# Patient Record
Sex: MALE | Race: WHITE | NOT HISPANIC OR LATINO
[De-identification: names, ages, dates, MRNs, and addresses within clinical notes are randomized per-mention and may not be internally consistent; named-entity substitution may affect disease eponyms.]

---

## 2017-06-28 ENCOUNTER — APPOINTMENT (OUTPATIENT)
Dept: UROLOGY | Facility: CLINIC | Age: 32
End: 2017-06-28

## 2017-06-28 VITALS
SYSTOLIC BLOOD PRESSURE: 127 MMHG | TEMPERATURE: 97.6 F | DIASTOLIC BLOOD PRESSURE: 89 MMHG | WEIGHT: 220 LBS | HEIGHT: 69 IN | BODY MASS INDEX: 32.58 KG/M2 | HEART RATE: 79 BPM | OXYGEN SATURATION: 96 %

## 2017-09-13 ENCOUNTER — RESULT REVIEW (OUTPATIENT)
Age: 32
End: 2017-09-13

## 2018-09-07 ENCOUNTER — APPOINTMENT (OUTPATIENT)
Dept: UROLOGY | Facility: CLINIC | Age: 33
End: 2018-09-07
Payer: COMMERCIAL

## 2018-09-07 VITALS
HEIGHT: 69 IN | BODY MASS INDEX: 33.33 KG/M2 | OXYGEN SATURATION: 97 % | SYSTOLIC BLOOD PRESSURE: 130 MMHG | RESPIRATION RATE: 18 BRPM | HEART RATE: 88 BPM | WEIGHT: 225 LBS | DIASTOLIC BLOOD PRESSURE: 82 MMHG

## 2018-09-07 DIAGNOSIS — N46.9 MALE INFERTILITY, UNSPECIFIED: ICD-10-CM

## 2018-09-07 PROCEDURE — 99213 OFFICE O/P EST LOW 20 MIN: CPT

## 2018-09-21 ENCOUNTER — APPOINTMENT (OUTPATIENT)
Dept: UROLOGY | Facility: CLINIC | Age: 33
End: 2018-09-21
Payer: COMMERCIAL

## 2018-09-21 PROCEDURE — 54235 NJX CORPORA CAVERNOSA RX AGT: CPT

## 2018-09-21 PROCEDURE — 93980 PENILE VASCULAR STUDY: CPT

## 2018-09-24 ENCOUNTER — MEDICATION RENEWAL (OUTPATIENT)
Age: 33
End: 2018-09-24

## 2018-11-30 ENCOUNTER — EMERGENCY (EMERGENCY)
Facility: HOSPITAL | Age: 33
LOS: 0 days | Discharge: HOME | End: 2018-12-01
Attending: EMERGENCY MEDICINE | Admitting: EMERGENCY MEDICINE
Payer: COMMERCIAL

## 2018-11-30 VITALS
HEART RATE: 96 BPM | SYSTOLIC BLOOD PRESSURE: 152 MMHG | TEMPERATURE: 98 F | DIASTOLIC BLOOD PRESSURE: 95 MMHG | OXYGEN SATURATION: 100 % | RESPIRATION RATE: 18 BRPM

## 2018-11-30 DIAGNOSIS — R00.2 PALPITATIONS: ICD-10-CM

## 2018-11-30 DIAGNOSIS — R07.9 CHEST PAIN, UNSPECIFIED: ICD-10-CM

## 2018-11-30 DIAGNOSIS — Z88.0 ALLERGY STATUS TO PENICILLIN: ICD-10-CM

## 2018-11-30 DIAGNOSIS — Z79.899 OTHER LONG TERM (CURRENT) DRUG THERAPY: ICD-10-CM

## 2018-11-30 PROCEDURE — 99285 EMERGENCY DEPT VISIT HI MDM: CPT | Mod: 25

## 2018-12-01 VITALS
TEMPERATURE: 99 F | DIASTOLIC BLOOD PRESSURE: 66 MMHG | SYSTOLIC BLOOD PRESSURE: 136 MMHG | HEART RATE: 94 BPM | RESPIRATION RATE: 17 BRPM | OXYGEN SATURATION: 100 %

## 2018-12-01 LAB
ALBUMIN SERPL ELPH-MCNC: 3.9 G/DL — SIGNIFICANT CHANGE UP (ref 3.3–5)
ALP SERPL-CCNC: 107 U/L — SIGNIFICANT CHANGE UP (ref 40–120)
ALT FLD-CCNC: 36 U/L — SIGNIFICANT CHANGE UP (ref 12–78)
AMPHET UR-MCNC: NEGATIVE — SIGNIFICANT CHANGE UP
ANION GAP SERPL CALC-SCNC: 9 MMOL/L — SIGNIFICANT CHANGE UP (ref 5–17)
APAP SERPL-MCNC: < 2 UG/ML (ref 10–30)
APPEARANCE UR: CLEAR — SIGNIFICANT CHANGE UP
AST SERPL-CCNC: 24 U/L — SIGNIFICANT CHANGE UP (ref 15–37)
BARBITURATES UR SCN-MCNC: NEGATIVE — SIGNIFICANT CHANGE UP
BASOPHILS # BLD AUTO: 0.03 K/UL — SIGNIFICANT CHANGE UP (ref 0–0.2)
BASOPHILS NFR BLD AUTO: 0.3 % — SIGNIFICANT CHANGE UP (ref 0–2)
BENZODIAZ UR-MCNC: NEGATIVE — SIGNIFICANT CHANGE UP
BILIRUB SERPL-MCNC: 0.3 MG/DL — SIGNIFICANT CHANGE UP (ref 0.2–1.2)
BILIRUB UR-MCNC: NEGATIVE — SIGNIFICANT CHANGE UP
BUN SERPL-MCNC: 23 MG/DL — SIGNIFICANT CHANGE UP (ref 7–23)
CALCIUM SERPL-MCNC: 8.8 MG/DL — SIGNIFICANT CHANGE UP (ref 8.5–10.1)
CHLORIDE SERPL-SCNC: 111 MMOL/L — HIGH (ref 96–108)
CO2 SERPL-SCNC: 21 MMOL/L — LOW (ref 22–31)
COCAINE METAB.OTHER UR-MCNC: NEGATIVE — SIGNIFICANT CHANGE UP
COLOR SPEC: YELLOW — SIGNIFICANT CHANGE UP
CREAT SERPL-MCNC: 1.14 MG/DL — SIGNIFICANT CHANGE UP (ref 0.5–1.3)
DIFF PNL FLD: NEGATIVE — SIGNIFICANT CHANGE UP
EOSINOPHIL # BLD AUTO: 0.11 K/UL — SIGNIFICANT CHANGE UP (ref 0–0.5)
EOSINOPHIL NFR BLD AUTO: 1.2 % — SIGNIFICANT CHANGE UP (ref 0–6)
ETHANOL SERPL-MCNC: <10 MG/DL — SIGNIFICANT CHANGE UP (ref 0–10)
GLUCOSE SERPL-MCNC: 97 MG/DL — SIGNIFICANT CHANGE UP (ref 70–99)
GLUCOSE UR QL: NEGATIVE MG/DL — SIGNIFICANT CHANGE UP
HCT VFR BLD CALC: 37.8 % — LOW (ref 39–50)
HGB BLD-MCNC: 12.7 G/DL — LOW (ref 13–17)
IMM GRANULOCYTES NFR BLD AUTO: 0.3 % — SIGNIFICANT CHANGE UP (ref 0–1.5)
KETONES UR-MCNC: NEGATIVE — SIGNIFICANT CHANGE UP
LEUKOCYTE ESTERASE UR-ACNC: NEGATIVE — SIGNIFICANT CHANGE UP
LYMPHOCYTES # BLD AUTO: 1.6 K/UL — SIGNIFICANT CHANGE UP (ref 1–3.3)
LYMPHOCYTES # BLD AUTO: 16.8 % — SIGNIFICANT CHANGE UP (ref 13–44)
MCHC RBC-ENTMCNC: 26.4 PG — LOW (ref 27–34)
MCHC RBC-ENTMCNC: 33.6 GM/DL — SIGNIFICANT CHANGE UP (ref 32–36)
MCV RBC AUTO: 78.6 FL — LOW (ref 80–100)
METHADONE UR-MCNC: NEGATIVE — SIGNIFICANT CHANGE UP
MONOCYTES # BLD AUTO: 0.68 K/UL — SIGNIFICANT CHANGE UP (ref 0–0.9)
MONOCYTES NFR BLD AUTO: 7.1 % — SIGNIFICANT CHANGE UP (ref 2–14)
NEUTROPHILS # BLD AUTO: 7.09 K/UL — SIGNIFICANT CHANGE UP (ref 1.8–7.4)
NEUTROPHILS NFR BLD AUTO: 74.3 % — SIGNIFICANT CHANGE UP (ref 43–77)
NITRITE UR-MCNC: NEGATIVE — SIGNIFICANT CHANGE UP
NRBC # BLD: 0 /100 WBCS — SIGNIFICANT CHANGE UP (ref 0–0)
OPIATES UR-MCNC: NEGATIVE — SIGNIFICANT CHANGE UP
PCP SPEC-MCNC: SIGNIFICANT CHANGE UP
PCP UR-MCNC: NEGATIVE — SIGNIFICANT CHANGE UP
PH UR: 8 — SIGNIFICANT CHANGE UP (ref 5–8)
PLATELET # BLD AUTO: 244 K/UL — SIGNIFICANT CHANGE UP (ref 150–400)
POTASSIUM SERPL-MCNC: 3.4 MMOL/L — LOW (ref 3.5–5.3)
POTASSIUM SERPL-SCNC: 3.4 MMOL/L — LOW (ref 3.5–5.3)
PROT SERPL-MCNC: 7.5 GM/DL — SIGNIFICANT CHANGE UP (ref 6–8.3)
PROT UR-MCNC: NEGATIVE MG/DL — SIGNIFICANT CHANGE UP
RBC # BLD: 4.81 M/UL — SIGNIFICANT CHANGE UP (ref 4.2–5.8)
RBC # FLD: 13.4 % — SIGNIFICANT CHANGE UP (ref 10.3–14.5)
SALICYLATES SERPL-MCNC: <1.7 MG/DL — LOW (ref 2.8–20)
SODIUM SERPL-SCNC: 141 MMOL/L — SIGNIFICANT CHANGE UP (ref 135–145)
SP GR SPEC: 1.01 — SIGNIFICANT CHANGE UP (ref 1.01–1.02)
THC UR QL: NEGATIVE — SIGNIFICANT CHANGE UP
TSH SERPL-MCNC: 1.21 UU/ML — SIGNIFICANT CHANGE UP (ref 0.34–4.82)
UROBILINOGEN FLD QL: NEGATIVE MG/DL — SIGNIFICANT CHANGE UP
WBC # BLD: 9.54 K/UL — SIGNIFICANT CHANGE UP (ref 3.8–10.5)
WBC # FLD AUTO: 9.54 K/UL — SIGNIFICANT CHANGE UP (ref 3.8–10.5)

## 2018-12-01 PROCEDURE — 93010 ELECTROCARDIOGRAM REPORT: CPT

## 2018-12-01 PROCEDURE — 71046 X-RAY EXAM CHEST 2 VIEWS: CPT | Mod: 26

## 2018-12-01 RX ORDER — SODIUM CHLORIDE 9 MG/ML
2000 INJECTION INTRAMUSCULAR; INTRAVENOUS; SUBCUTANEOUS ONCE
Qty: 0 | Refills: 0 | Status: COMPLETED | OUTPATIENT
Start: 2018-12-01 | End: 2018-12-01

## 2018-12-01 RX ADMIN — SODIUM CHLORIDE 2000 MILLILITER(S): 9 INJECTION INTRAMUSCULAR; INTRAVENOUS; SUBCUTANEOUS at 01:31

## 2018-12-01 RX ADMIN — Medication 1 MILLIGRAM(S): at 01:55

## 2018-12-01 RX ADMIN — SODIUM CHLORIDE 2000 MILLILITER(S): 9 INJECTION INTRAMUSCULAR; INTRAVENOUS; SUBCUTANEOUS at 02:31

## 2018-12-01 NOTE — ED ADULT NURSE NOTE - CHPI ED NUR SYMPTOMS NEG
no fever/no pain/no vomiting/no decreased eating/drinking/no weakness/no dizziness/no chills/no tingling/no nausea

## 2018-12-01 NOTE — ED PROVIDER NOTE - NSFOLLOWUPINSTRUCTIONS_ED_ALL_ED_FT
please follow up with your doctor in 2-3 days.   drink plenty of fluids.   return to ED for any concerns

## 2018-12-01 NOTE — ED PROVIDER NOTE - OBJECTIVE STATEMENT
32 y/o male in ED c/o sudden onset of cp, palpitations, sob, numbness and tingling to bilateral fingers and "difficulty catching my breath" x 1 hr while on train going home from work.   pt denies any previous episodes.    pt denies any drugs or alcohol use.   pt denies any fever, HA, neck pain, n/v/d/abd pain.   tolerating PO.   no sick contacts or recent travel

## 2019-08-29 NOTE — ASU PATIENT PROFILE, ADULT - PMH
ADHD    Fatty liver    GERD (gastroesophageal reflux disease)    HLD (hyperlipidemia)    Vitamin B 12 deficiency

## 2019-08-30 ENCOUNTER — RESULT REVIEW (OUTPATIENT)
Age: 34
End: 2019-08-30

## 2019-08-30 ENCOUNTER — OUTPATIENT (OUTPATIENT)
Dept: INPATIENT UNIT | Facility: HOSPITAL | Age: 34
LOS: 1 days | Discharge: HOME | End: 2019-08-30
Payer: COMMERCIAL

## 2019-08-30 VITALS
OXYGEN SATURATION: 99 % | HEART RATE: 76 BPM | TEMPERATURE: 98 F | WEIGHT: 206.35 LBS | HEIGHT: 69 IN | RESPIRATION RATE: 14 BRPM | SYSTOLIC BLOOD PRESSURE: 110 MMHG | DIASTOLIC BLOOD PRESSURE: 67 MMHG

## 2019-08-30 VITALS
DIASTOLIC BLOOD PRESSURE: 68 MMHG | OXYGEN SATURATION: 100 % | TEMPERATURE: 98 F | RESPIRATION RATE: 15 BRPM | HEART RATE: 80 BPM | SYSTOLIC BLOOD PRESSURE: 120 MMHG

## 2019-08-30 DIAGNOSIS — J03.90 ACUTE TONSILLITIS, UNSPECIFIED: ICD-10-CM

## 2019-08-30 PROCEDURE — 88304 TISSUE EXAM BY PATHOLOGIST: CPT | Mod: 26

## 2019-08-30 PROCEDURE — 88304 TISSUE EXAM BY PATHOLOGIST: CPT

## 2019-08-30 RX ORDER — PROCHLORPERAZINE MALEATE 5 MG
10 TABLET ORAL ONCE
Refills: 0 | Status: COMPLETED | OUTPATIENT
Start: 2019-08-30 | End: 2019-08-30

## 2019-08-30 RX ORDER — METHYLPHENIDATE HCL 5 MG
1 TABLET ORAL
Qty: 0 | Refills: 0 | DISCHARGE

## 2019-08-30 RX ORDER — ACETAMINOPHEN 500 MG
975 TABLET ORAL ONCE
Refills: 0 | Status: COMPLETED | OUTPATIENT
Start: 2019-08-30 | End: 2019-08-30

## 2019-08-30 RX ORDER — ONDANSETRON 8 MG/1
4 TABLET, FILM COATED ORAL ONCE
Refills: 0 | Status: COMPLETED | OUTPATIENT
Start: 2019-08-30 | End: 2019-08-30

## 2019-08-30 RX ORDER — SODIUM CHLORIDE 9 MG/ML
1000 INJECTION, SOLUTION INTRAVENOUS
Refills: 0 | Status: DISCONTINUED | OUTPATIENT
Start: 2019-08-30 | End: 2019-08-30

## 2019-08-30 RX ORDER — MEPERIDINE HYDROCHLORIDE 50 MG/ML
12.5 INJECTION INTRAMUSCULAR; INTRAVENOUS; SUBCUTANEOUS
Refills: 0 | Status: DISCONTINUED | OUTPATIENT
Start: 2019-08-30 | End: 2019-08-30

## 2019-08-30 RX ORDER — FAMOTIDINE 10 MG/ML
20 INJECTION INTRAVENOUS ONCE
Refills: 0 | Status: COMPLETED | OUTPATIENT
Start: 2019-08-30 | End: 2019-08-30

## 2019-08-30 RX ORDER — RANITIDINE HYDROCHLORIDE 150 MG/1
1 TABLET, FILM COATED ORAL
Qty: 0 | Refills: 0 | DISCHARGE

## 2019-08-30 RX ORDER — FENTANYL CITRATE 50 UG/ML
50 INJECTION INTRAVENOUS
Refills: 0 | Status: DISCONTINUED | OUTPATIENT
Start: 2019-08-30 | End: 2019-08-30

## 2019-08-30 RX ORDER — OXYCODONE HYDROCHLORIDE 5 MG/1
5 TABLET ORAL ONCE
Refills: 0 | Status: DISCONTINUED | OUTPATIENT
Start: 2019-08-30 | End: 2019-08-30

## 2019-08-30 RX ORDER — OMEPRAZOLE 10 MG/1
1 CAPSULE, DELAYED RELEASE ORAL
Qty: 0 | Refills: 0 | DISCHARGE

## 2019-08-30 RX ORDER — ROBINUL 0.2 MG/ML
0.2 INJECTION INTRAMUSCULAR; INTRAVENOUS ONCE
Refills: 0 | Status: COMPLETED | OUTPATIENT
Start: 2019-08-30 | End: 2019-08-30

## 2019-08-30 RX ORDER — METOCLOPRAMIDE HCL 10 MG
10 TABLET ORAL ONCE
Refills: 0 | Status: COMPLETED | OUTPATIENT
Start: 2019-08-30 | End: 2019-08-30

## 2019-08-30 RX ORDER — OXYCODONE HYDROCHLORIDE 5 MG/1
10 TABLET ORAL ONCE
Refills: 0 | Status: DISCONTINUED | OUTPATIENT
Start: 2019-08-30 | End: 2019-08-30

## 2019-08-30 RX ADMIN — Medication 975 MILLIGRAM(S): at 13:09

## 2019-08-30 RX ADMIN — ROBINUL 0.2 MILLIGRAM(S): 0.2 INJECTION INTRAMUSCULAR; INTRAVENOUS at 13:08

## 2019-08-30 RX ADMIN — ONDANSETRON 4 MILLIGRAM(S): 8 TABLET, FILM COATED ORAL at 14:40

## 2019-08-30 RX ADMIN — FENTANYL CITRATE 50 MICROGRAM(S): 50 INJECTION INTRAVENOUS at 15:46

## 2019-08-30 RX ADMIN — FAMOTIDINE 20 MILLIGRAM(S): 10 INJECTION INTRAVENOUS at 13:08

## 2019-08-30 RX ADMIN — FENTANYL CITRATE 50 MICROGRAM(S): 50 INJECTION INTRAVENOUS at 14:35

## 2019-08-30 RX ADMIN — Medication 975 MILLIGRAM(S): at 13:08

## 2019-08-30 RX ADMIN — Medication 10 MILLIGRAM(S): at 14:35

## 2019-08-30 RX ADMIN — Medication 10 MILLIGRAM(S): at 13:08

## 2019-08-30 RX ADMIN — OXYCODONE HYDROCHLORIDE 5 MILLIGRAM(S): 5 TABLET ORAL at 14:45

## 2019-08-30 RX ADMIN — OXYCODONE HYDROCHLORIDE 5 MILLIGRAM(S): 5 TABLET ORAL at 15:47

## 2019-09-05 DIAGNOSIS — J35.01 CHRONIC TONSILLITIS: ICD-10-CM

## 2019-09-09 ENCOUNTER — INPATIENT (INPATIENT)
Facility: HOSPITAL | Age: 34
LOS: 0 days | Discharge: HOME | DRG: 909 | End: 2019-09-09
Attending: OTOLARYNGOLOGY | Admitting: OTOLARYNGOLOGY
Payer: COMMERCIAL

## 2019-09-09 VITALS
HEART RATE: 95 BPM | DIASTOLIC BLOOD PRESSURE: 68 MMHG | TEMPERATURE: 99 F | SYSTOLIC BLOOD PRESSURE: 123 MMHG | RESPIRATION RATE: 15 BRPM | OXYGEN SATURATION: 99 %

## 2019-09-09 VITALS — HEIGHT: 69 IN | WEIGHT: 199.96 LBS

## 2019-09-09 DIAGNOSIS — J35.8 OTHER CHRONIC DISEASES OF TONSILS AND ADENOIDS: ICD-10-CM

## 2019-09-09 PROBLEM — E53.8 DEFICIENCY OF OTHER SPECIFIED B GROUP VITAMINS: Chronic | Status: ACTIVE | Noted: 2019-08-29

## 2019-09-09 PROBLEM — K21.9 GASTRO-ESOPHAGEAL REFLUX DISEASE WITHOUT ESOPHAGITIS: Chronic | Status: ACTIVE | Noted: 2019-08-29

## 2019-09-09 PROBLEM — F90.9 ATTENTION-DEFICIT HYPERACTIVITY DISORDER, UNSPECIFIED TYPE: Chronic | Status: ACTIVE | Noted: 2019-08-29

## 2019-09-09 PROBLEM — E78.5 HYPERLIPIDEMIA, UNSPECIFIED: Chronic | Status: ACTIVE | Noted: 2019-08-29

## 2019-09-09 PROBLEM — K76.0 FATTY (CHANGE OF) LIVER, NOT ELSEWHERE CLASSIFIED: Chronic | Status: ACTIVE | Noted: 2019-08-29

## 2019-09-09 LAB
ALBUMIN SERPL ELPH-MCNC: 3.6 G/DL — SIGNIFICANT CHANGE UP (ref 3.3–5)
ALP SERPL-CCNC: 87 U/L — SIGNIFICANT CHANGE UP (ref 40–120)
ALT FLD-CCNC: 28 U/L — SIGNIFICANT CHANGE UP (ref 12–78)
ANION GAP SERPL CALC-SCNC: 6 MMOL/L — SIGNIFICANT CHANGE UP (ref 5–17)
AST SERPL-CCNC: 13 U/L — LOW (ref 15–37)
BASOPHILS # BLD AUTO: 0 K/UL — SIGNIFICANT CHANGE UP (ref 0–0.2)
BASOPHILS NFR BLD AUTO: 0 % — SIGNIFICANT CHANGE UP (ref 0–2)
BILIRUB SERPL-MCNC: 0.4 MG/DL — SIGNIFICANT CHANGE UP (ref 0.2–1.2)
BUN SERPL-MCNC: 31 MG/DL — HIGH (ref 7–23)
CALCIUM SERPL-MCNC: 9.3 MG/DL — SIGNIFICANT CHANGE UP (ref 8.5–10.1)
CHLORIDE SERPL-SCNC: 108 MMOL/L — SIGNIFICANT CHANGE UP (ref 96–108)
CO2 SERPL-SCNC: 28 MMOL/L — SIGNIFICANT CHANGE UP (ref 22–31)
CREAT SERPL-MCNC: 1.05 MG/DL — SIGNIFICANT CHANGE UP (ref 0.5–1.3)
EOSINOPHIL # BLD AUTO: 0.16 K/UL — SIGNIFICANT CHANGE UP (ref 0–0.5)
EOSINOPHIL NFR BLD AUTO: 1 % — SIGNIFICANT CHANGE UP (ref 0–6)
GLUCOSE SERPL-MCNC: 110 MG/DL — HIGH (ref 70–99)
HCT VFR BLD CALC: 36.6 % — LOW (ref 39–50)
HGB BLD-MCNC: 11.9 G/DL — LOW (ref 13–17)
LIDOCAIN IGE QN: 81 U/L — SIGNIFICANT CHANGE UP (ref 73–393)
LYMPHOCYTES # BLD AUTO: 15 % — SIGNIFICANT CHANGE UP (ref 13–44)
LYMPHOCYTES # BLD AUTO: 2.39 K/UL — SIGNIFICANT CHANGE UP (ref 1–3.3)
MCHC RBC-ENTMCNC: 26.9 PG — LOW (ref 27–34)
MCHC RBC-ENTMCNC: 32.5 GM/DL — SIGNIFICANT CHANGE UP (ref 32–36)
MCV RBC AUTO: 82.6 FL — SIGNIFICANT CHANGE UP (ref 80–100)
MONOCYTES # BLD AUTO: 1.27 K/UL — HIGH (ref 0–0.9)
MONOCYTES NFR BLD AUTO: 8 % — SIGNIFICANT CHANGE UP (ref 2–14)
NEUTROPHILS # BLD AUTO: 12.09 K/UL — HIGH (ref 1.8–7.4)
NEUTROPHILS NFR BLD AUTO: 76 % — SIGNIFICANT CHANGE UP (ref 43–77)
NRBC # BLD: SIGNIFICANT CHANGE UP /100 WBCS (ref 0–0)
PLATELET # BLD AUTO: 473 K/UL — HIGH (ref 150–400)
POTASSIUM SERPL-MCNC: 4.2 MMOL/L — SIGNIFICANT CHANGE UP (ref 3.5–5.3)
POTASSIUM SERPL-SCNC: 4.2 MMOL/L — SIGNIFICANT CHANGE UP (ref 3.5–5.3)
PROT SERPL-MCNC: 7.2 GM/DL — SIGNIFICANT CHANGE UP (ref 6–8.3)
RBC # BLD: 4.43 M/UL — SIGNIFICANT CHANGE UP (ref 4.2–5.8)
RBC # FLD: 12.8 % — SIGNIFICANT CHANGE UP (ref 10.3–14.5)
SODIUM SERPL-SCNC: 142 MMOL/L — SIGNIFICANT CHANGE UP (ref 135–145)
WBC # BLD: 15.91 K/UL — HIGH (ref 3.8–10.5)
WBC # FLD AUTO: 15.91 K/UL — HIGH (ref 3.8–10.5)

## 2019-09-09 PROCEDURE — 93010 ELECTROCARDIOGRAM REPORT: CPT

## 2019-09-09 PROCEDURE — 86920 COMPATIBILITY TEST SPIN: CPT

## 2019-09-09 RX ORDER — ONDANSETRON 8 MG/1
4 TABLET, FILM COATED ORAL EVERY 6 HOURS
Refills: 0 | Status: DISCONTINUED | OUTPATIENT
Start: 2019-09-09 | End: 2019-09-09

## 2019-09-09 RX ORDER — SODIUM CHLORIDE 9 MG/ML
1000 INJECTION INTRAMUSCULAR; INTRAVENOUS; SUBCUTANEOUS ONCE
Refills: 0 | Status: COMPLETED | OUTPATIENT
Start: 2019-09-09 | End: 2019-09-09

## 2019-09-09 RX ORDER — SODIUM CHLORIDE 9 MG/ML
1000 INJECTION, SOLUTION INTRAVENOUS
Refills: 0 | Status: DISCONTINUED | OUTPATIENT
Start: 2019-09-09 | End: 2019-09-09

## 2019-09-09 RX ORDER — OXYCODONE HYDROCHLORIDE 5 MG/1
5 TABLET ORAL ONCE
Refills: 0 | Status: DISCONTINUED | OUTPATIENT
Start: 2019-09-09 | End: 2019-09-09

## 2019-09-09 RX ORDER — FENTANYL CITRATE 50 UG/ML
50 INJECTION INTRAVENOUS
Refills: 0 | Status: DISCONTINUED | OUTPATIENT
Start: 2019-09-09 | End: 2019-09-09

## 2019-09-09 RX ORDER — OXYCODONE AND ACETAMINOPHEN 5; 325 MG/1; MG/1
1 TABLET ORAL EVERY 4 HOURS
Refills: 0 | Status: DISCONTINUED | OUTPATIENT
Start: 2019-09-09 | End: 2019-09-09

## 2019-09-09 RX ADMIN — SODIUM CHLORIDE 1000 MILLILITER(S): 9 INJECTION INTRAMUSCULAR; INTRAVENOUS; SUBCUTANEOUS at 14:15

## 2019-09-09 NOTE — ED ADULT NURSE NOTE - OBJECTIVE STATEMENT
Pt presents to the ED s/p tonsillary bleed after having tonsils removed this past week. Pt denies fevers/chills. Pt currently diaphoretic, tachycardic to 140's, pt with hematemesis.

## 2019-09-09 NOTE — ED STATDOCS - OBJECTIVE STATEMENT
34 y/o male with a PMHx of ADHD, fatty liver, GERD, HLD, vitamin B12 deficiency, s/p tonsillectomy 11 days ago presents to the ED for bleeding from right tonsil starting today. Pt was seen at Dr. Mejia's office and was sent to ED for preop. Pt to go to OR today. No other complaints at this time.

## 2019-09-09 NOTE — ASU DISCHARGE PLAN (ADULT/PEDIATRIC) - CARE PROVIDER_API CALL
Fili Mejia)  Otolaryngology  [address] [place] [address]  Phone: [phone]  Fax: [phone]  Follow Up Time: 1-3 days

## 2019-09-09 NOTE — ED ADULT TRIAGE NOTE - CHIEF COMPLAINT QUOTE
Patient presents with bleeding from right tonsil. Patient was seen in Dr Mejia office and sent to ER for registration for pre op. Patient reports last PO intake last night. Denies difficulty breathing denies trouble swallowing.

## 2019-09-09 NOTE — ED ADULT NURSE NOTE - NSIMPLEMENTINTERV_GEN_ALL_ED
Implemented All Universal Safety Interventions:  [place] to call system. Call bell, personal items and telephone within reach. Instruct patient to call for assistance. Room bathroom lighting operational. Non-slip footwear when patient is off stretcher. Physically safe environment: no spills, clutter or unnecessary equipment. Stretcher in lowest position, wheels locked, appropriate side rails in place.

## 2019-09-09 NOTE — ED STATDOCS - PROGRESS NOTE DETAILS
Patient seen and evaluated in intake with ED Attending,  ED attending note and orders reviewed, will continue with patient follow up and care -Rosie Holman PA-C   spoke to Opal Mejia and he will take pt to the OR. labs ordered, will reeval. -Rosie Holman PA-C pt spitting up blood and looks pale will continue to follow in the main, gave ivfs will reeval freq. -Rosie Holman PA-C pt stable going to OR. -Rosie STEWARDC

## 2019-09-09 NOTE — ED STATDOCS - ENMT, MLM
Nasal mucosa clear.  Mouth with normal mucosa  Throat has no vesicles, no oropharyngeal exudates and uvula is midline. Right sided tonsillar bleeding, red in color.

## 2019-09-16 DIAGNOSIS — K76.0 FATTY (CHANGE OF) LIVER, NOT ELSEWHERE CLASSIFIED: ICD-10-CM

## 2019-09-16 DIAGNOSIS — J95.830 POSTPROCEDURAL HEMORRHAGE OF A RESPIRATORY SYSTEM ORGAN OR STRUCTURE FOLLOWING A RESPIRATORY SYSTEM PROCEDURE: ICD-10-CM

## 2019-09-16 DIAGNOSIS — Z88.0 ALLERGY STATUS TO PENICILLIN: ICD-10-CM

## 2019-09-16 DIAGNOSIS — K21.9 GASTRO-ESOPHAGEAL REFLUX DISEASE WITHOUT ESOPHAGITIS: ICD-10-CM
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
## 2019-09-16 DIAGNOSIS — F90.9 ATTENTION-DEFICIT HYPERACTIVITY DISORDER, UNSPECIFIED TYPE: ICD-10-CM

## 2019-09-16 DIAGNOSIS — E78.5 HYPERLIPIDEMIA, UNSPECIFIED: ICD-10-CM

## 2019-09-16 DIAGNOSIS — E53.8 DEFICIENCY OF OTHER SPECIFIED B GROUP VITAMINS: ICD-10-CM
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
## 2019-10-11 ENCOUNTER — APPOINTMENT (OUTPATIENT)
Dept: UROLOGY | Facility: CLINIC | Age: 34
End: 2019-10-11
Payer: COMMERCIAL

## 2019-10-11 VITALS
BODY MASS INDEX: 30.21 KG/M2 | RESPIRATION RATE: 18 BRPM | HEIGHT: 69 IN | HEART RATE: 89 BPM | DIASTOLIC BLOOD PRESSURE: 69 MMHG | WEIGHT: 204 LBS | SYSTOLIC BLOOD PRESSURE: 117 MMHG | TEMPERATURE: 97.3 F | OXYGEN SATURATION: 99 %

## 2019-10-11 DIAGNOSIS — Z30.2 ENCOUNTER FOR STERILIZATION: ICD-10-CM

## 2019-10-11 PROCEDURE — 99214 OFFICE O/P EST MOD 30 MIN: CPT

## 2019-10-17 LAB — TESTOST SERPL-MCNC: 189 NG/DL

## 2019-10-17 NOTE — REVIEW OF SYSTEMS
[Feeling Tired] : feeling tired [see HPI] : see HPI [Negative] : Heme/Lymph [Fever] : no fever [Chills] : no chills [Feeling Poorly] : not feeling poorly [Recent Weight Gain (___ Lbs)] : no recent weight gain [Recent Weight Loss (___ Lbs)] : no recent weight loss

## 2019-10-17 NOTE — HISTORY OF PRESENT ILLNESS
[FreeTextEntry1] : The patient is here and is concerned about his testosterone level is a feels that he is low sex drive.  \par He also is interested in learning about a vasectomy as he is considering this option

## 2019-10-17 NOTE — END OF VISIT
[FreeTextEntry3] : The procedure was gone over in detail and he is informed that infertility is not guaranteed until a negative semen analysis is obtained. He was offered the procedure as an ambulatory procedure the hospital versus the office and also that post procedural hematomas may need to be operated on under anesthesia the hospital.\par A testosterone level was ordered as well

## 2019-10-28 ENCOUNTER — APPOINTMENT (OUTPATIENT)
Dept: NEUROLOGY | Facility: CLINIC | Age: 34
End: 2019-10-28
Payer: COMMERCIAL

## 2019-10-28 VITALS
SYSTOLIC BLOOD PRESSURE: 126 MMHG | DIASTOLIC BLOOD PRESSURE: 86 MMHG | HEART RATE: 93 BPM | WEIGHT: 204 LBS | HEIGHT: 69 IN | BODY MASS INDEX: 30.21 KG/M2

## 2019-10-28 DIAGNOSIS — Z80.3 FAMILY HISTORY OF MALIGNANT NEOPLASM OF BREAST: ICD-10-CM

## 2019-10-28 DIAGNOSIS — K21.9 GASTRO-ESOPHAGEAL REFLUX DISEASE W/OUT ESOPHAGITIS: ICD-10-CM

## 2019-10-28 DIAGNOSIS — Z82.3 FAMILY HISTORY OF STROKE: ICD-10-CM

## 2019-10-28 DIAGNOSIS — Z78.9 OTHER SPECIFIED HEALTH STATUS: ICD-10-CM

## 2019-10-28 DIAGNOSIS — Z82.49 FAMILY HISTORY OF ISCHEMIC HEART DISEASE AND OTHER DISEASES OF THE CIRCULATORY SYSTEM: ICD-10-CM

## 2019-10-28 PROCEDURE — 99244 OFF/OP CNSLTJ NEW/EST MOD 40: CPT

## 2019-10-28 RX ORDER — OMEPRAZOLE 40 MG/1
40 CAPSULE, DELAYED RELEASE ORAL
Refills: 0 | Status: ACTIVE | COMMUNITY

## 2019-10-28 RX ORDER — SILDENAFIL 25 MG/1
25 TABLET ORAL
Qty: 30 | Refills: 3 | Status: DISCONTINUED | COMMUNITY
Start: 2018-09-07 | End: 2019-10-28

## 2019-10-28 RX ORDER — SILDENAFIL 20 MG/1
20 TABLET ORAL
Qty: 90 | Refills: 11 | Status: DISCONTINUED | COMMUNITY
Start: 2018-09-21 | End: 2019-10-28

## 2019-10-28 RX ORDER — CLOMIPHENE CITRATE 50 MG/1
50 TABLET ORAL DAILY
Qty: 90 | Refills: 1 | Status: DISCONTINUED | COMMUNITY
Start: 2017-07-11 | End: 2019-10-28

## 2019-10-28 NOTE — PHYSICAL EXAM
[FreeTextEntry1] : Examination:\par Constitutional: normal, no apparent distress\par Eyes: normal conjunctiva b/l, no ptosis, visual fields full\par Respiratory: no respiratory distress, normal effort, normal auscultation\par Cardiovascular: normal rate, rhythm, no murmurs\par Neck: supple, no masses\par Vascular: carotids normal\par Skin: normal color, no rashes\par Psych: normal mood, affect\par \par Neurological:\par Memory:  oriented to person, place, time, recalled 2/3 words after 3 minutes. unable to perform serial 7s but spelled WORLD backwards.\par Language intact/no aphasia\par Cranial Nerves: II-XII intact, Pupils equally round and reactive to light, ocular muscles/movements intact, no ptosis, no facial weakness, tongue protrudes normally in the midline, \par Motor: normal tone, no pronator drift, full strength in all four extremities in the proximal and distal muscle groups\par Coordination: Fine motor movements intact, rapid alternating movements intact, finger to nose intact bilaterally\par Sensory: intact to light touch, vibration, joint position sense, negative Romberg examination\par DTRs: symmetric, 2+ in b/l triceps, 2+ in b/l biceps, 2+ in b/l brachioradialis, 2+ in bilateral patellars, 2+ in bilateral Achilles, Babinskis negative bilaterally\par Gait: narrow based, steady, able to walk on heels, toes, tandem gait\par \par

## 2019-10-28 NOTE — DISCUSSION/SUMMARY
[FreeTextEntry1] : Mr. Sainz is a 34 year old man with a history of ADD in childhood who now reports having memory problems for one year.\par He has a non-focal neurological examination other than some difficulty with short term recall (recalled 2/3 words).\par It is unclear if he is a true memory disturbance or if symptoms are more a manifestation of adult, residual ADHD.\par I am ordering neurotrax cognitive testing to help distinguish a true memory problem from an attention problem. I have advised him to not take his methylphenidate the day of the examination.\par EEG and MRI of the brain will be performed to look for any underlying pathology contributing to his symptoms.\par If cognitive testing shows a true memory disturbance I will check TSH and vitamin B12 levels.\par He may also note some improvement in his symptoms after he begins testosterone replacement therapy.\par \par We discussed the importance of healthy diet, daily exercise and good sleep. He is likely sleep deprived and needs to increase his nightly sleep duration.\par He should continue to use strategies to compensate for his cognitive difficulties including using a calendar/planner and reminders on his phone.\par \par I will follow up with him after the above testing, sooner if needed.

## 2019-10-28 NOTE — REVIEW OF SYSTEMS
[As Noted in HPI] : as noted in HPI [Negative] : Genitourinary [de-identified] : difficulty concentrating [FreeTextEntry9] : neck pain, back pain

## 2019-10-28 NOTE — HISTORY OF PRESENT ILLNESS
[FreeTextEntry1] : He reports that over the last year he has become forgetful. 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 He relies on text messages to remind him of something. If it is spoken to him he will not remember. \par \par He feels fidgety and has a hard time sitting still.\par When he was a kid he was diagnosed with ADD. He had a hard time focusing. He took medication (Ritalin) in elementary school and then stopped. He did well in high school without medication. \par \par He started back on stimulants about one year ago. He first tried Vyvanse initially and then was switched to methylphenidate.\par His current symptoms are present even with methylphenidate.\par He tries to stay organized but describes himself as messy. He has difficulty completing tasks and gets easily distracted.\par \par He denies having any anxiety or depression.\par \par He sleeps for about 6 hours per night on a good night. Sometimes he is working late and gets less sleep.\par His wife has not complained of nightly snoring. He snores if he sleeps on his back but he typically sleeps on his stomach.

## 2019-10-28 NOTE — CONSULT LETTER
[Dear  ___] : Dear  [unfilled], [Consult Letter:] : I had the pleasure of evaluating your patient, [unfilled]. [Please see my note below.] : Please see my note below. [Consult Closing:] : Thank you very much for allowing me to participate in the care of this patient.  If you have any questions, please do not hesitate to contact me. [FreeTextEntry2] : Fernie Solorzano [FreeTextEntry3] : Sincerely,\par \par \par Drea Hemphill MD\par Diplomate, American Academy of Psychiatry and Neurology\par Board Certified in the Subspecialty of Clinical Neurophysiology\par Board Certified in the Subspecialty of Sleep Medicine\par Board Certified in the Subspecialty of Epilepsy\par

## 2019-10-29 LAB
FSH SERPL-MCNC: 1.9 IU/L
LH SERPL-ACNC: 3.3 IU/L
PROLACTIN SERPL-MCNC: 8.8 NG/ML
TESTOST SERPL-MCNC: 185 NG/DL

## 2019-11-07 ENCOUNTER — APPOINTMENT (OUTPATIENT)
Dept: NEUROLOGY | Facility: CLINIC | Age: 34
End: 2019-11-07

## 2019-11-13 ENCOUNTER — APPOINTMENT (OUTPATIENT)
Dept: NEUROLOGY | Facility: CLINIC | Age: 34
End: 2019-11-13
Payer: COMMERCIAL

## 2019-11-13 PROCEDURE — 95816 EEG AWAKE AND DROWSY: CPT

## 2019-11-14 ENCOUNTER — APPOINTMENT (OUTPATIENT)
Dept: UROLOGY | Facility: CLINIC | Age: 34
End: 2019-11-14
Payer: COMMERCIAL

## 2019-11-14 PROCEDURE — 96372 THER/PROPH/DIAG INJ SC/IM: CPT

## 2019-11-14 PROCEDURE — 99213 OFFICE O/P EST LOW 20 MIN: CPT | Mod: 25

## 2019-11-14 NOTE — PHYSICAL EXAM
[General Appearance - Well Developed] : well developed [General Appearance - Well Nourished] : well nourished [Normal Appearance] : normal appearance [Well Groomed] : well groomed [General Appearance - In No Acute Distress] : no acute distress [Abdomen Soft] : soft [Abdomen Tenderness] : non-tender [Urethral Meatus] : meatus normal [Costovertebral Angle Tenderness] : no ~M costovertebral angle tenderness [Scrotum] : the scrotum was normal [Urinary Bladder Findings] : the bladder was normal on palpation [Testes Mass (___cm)] : there were no testicular masses [No Prostate Nodules] : no prostate nodules [Edema] : no peripheral edema [Respiration, Rhythm And Depth] : normal respiratory rhythm and effort [] : no respiratory distress [Exaggerated Use Of Accessory Muscles For Inspiration] : no accessory muscle use [Oriented To Time, Place, And Person] : oriented to person, place, and time [Affect] : the affect was normal [Mood] : the mood was normal [Not Anxious] : not anxious [Normal Station and Gait] : the gait and station were normal for the patient's age [No Focal Deficits] : no focal deficits [No Palpable Adenopathy] : no palpable adenopathy

## 2019-11-14 NOTE — REVIEW OF SYSTEMS
[Fever] : no fever [Chills] : no chills [Feeling Poorly] : not feeling poorly [Feeling Tired] : feeling tired [Recent Weight Gain (___ Lbs)] : no recent weight gain [Recent Weight Loss (___ Lbs)] : no recent weight loss [see HPI] : see HPI [Negative] : Heme/Lymph

## 2019-11-14 NOTE — HISTORY OF PRESENT ILLNESS
[FreeTextEntry1] : This patient has low testosterone in his marker hormones do not reflect this other than low testosterone. He is considering testosterone supplementation this was done over detail with

## 2019-11-14 NOTE — END OF VISIT
[FreeTextEntry3] : After discussion with the patient was decided to try supplementation and he was given 400 mg. A level be checked in 10-14 days with follow up in one

## 2019-11-18 ENCOUNTER — OTHER (OUTPATIENT)
Age: 34
End: 2019-11-18

## 2019-11-22 ENCOUNTER — APPOINTMENT (OUTPATIENT)
Dept: NEUROLOGY | Facility: CLINIC | Age: 34
End: 2019-11-22
Payer: COMMERCIAL

## 2019-11-22 VITALS
WEIGHT: 210 LBS | SYSTOLIC BLOOD PRESSURE: 119 MMHG | BODY MASS INDEX: 31.1 KG/M2 | DIASTOLIC BLOOD PRESSURE: 85 MMHG | HEART RATE: 105 BPM | HEIGHT: 69 IN

## 2019-11-22 PROCEDURE — 99213 OFFICE O/P EST LOW 20 MIN: CPT

## 2019-11-22 PROCEDURE — 96132 NRPSYC TST EVAL PHYS/QHP 1ST: CPT | Mod: 59

## 2019-11-22 NOTE — HISTORY OF PRESENT ILLNESS
[FreeTextEntry1] : I last saw Mr. Sainz on 10/28/19.\par \par He has no new complaints since the last visit. \par He has not found the Concerta to be helpful. \par He says that it curbs his appetite but otherwise he has no side effects.\par \par He states that he is forgetful and not unfocused. \par He will be told something and then it slips his mind.

## 2019-11-22 NOTE — DISCUSSION/SUMMARY
[FreeTextEntry1] : Mr. Sainz is a 34 year old man with a history of ADD in childhood who now reports having memory problems for one year.\par \par Despite his continued complaints of memory disturbance, neurotrax results are quite suggestive of ADHD.\par EEG is unremarkable. \par MRI brain with possible meningioma - awaiting repeat study with contrast.\par \par Will increase Concerta to 36 mg. \par Look out for any side effects with increased dose including palpitations, insomnia, appetite suppression.\par \par We discussed the importance of healthy diet, daily exercise and good sleep. He is likely sleep deprived and needs to increase his nightly sleep duration.\par He should continue to use strategies to compensate for his cognitive difficulties including using a calendar/planner and reminders on his phone.\par \par f/u within 6 months, sooner if needed. \par \par \par

## 2019-11-22 NOTE — DATA REVIEWED
[de-identified] : MRI brain noncontrast 11/16/19: A T2/FLAIR hyperintense and T1 slightly hyperintense 10x9x8 mm lesion in the left occipital lobe with possible tentorial attachment. Although nonspecific, finding is most suggestive for meningioma. Recommended dedicated MRI with and without contrast to further evaluate. [de-identified] : Neurotrax 11/22/19:\par Global cognitive score 103.5\par Memory 103.3\par executive function 111\par attention 91.6\par information processing speed 88.4\par visual spatial 120.5\par verbal function 109.3\par motor skills 100.6\par \par below average in attention and information processing speed. Above average in all other domains.  [de-identified] : EEG 11/13/19: normal

## 2019-11-22 NOTE — PROCEDURE
[FreeTextEntry1] : Neurotrax 11/22/19:\par Neurotrax 11/22/19:\par Global cognitive score 103.5\par Memory 103.3\par executive function 111\par attention 91.6\par information processing speed 88.4\par visual spatial 120.5\par verbal function 109.3\par motor skills 100.6\par \par below average in attention and information processing speed. Above average in all other domains.

## 2019-11-24 ENCOUNTER — LABORATORY RESULT (OUTPATIENT)
Age: 34
End: 2019-11-24

## 2019-11-29 LAB — TESTOST SERPL-MCNC: 718 NG/DL

## 2019-12-23 ENCOUNTER — APPOINTMENT (OUTPATIENT)
Dept: UROLOGY | Facility: CLINIC | Age: 34
End: 2019-12-23
Payer: COMMERCIAL

## 2019-12-23 ENCOUNTER — RX RENEWAL (OUTPATIENT)
Age: 34
End: 2019-12-23

## 2019-12-23 PROCEDURE — 96372 THER/PROPH/DIAG INJ SC/IM: CPT

## 2019-12-23 RX ADMIN — TESTOSTERONE CYPIONATE 0 MG/ML: 200 INJECTION INTRAMUSCULAR at 00:00

## 2020-01-17 ENCOUNTER — APPOINTMENT (OUTPATIENT)
Dept: UROLOGY | Facility: CLINIC | Age: 35
End: 2020-01-17
Payer: COMMERCIAL

## 2020-01-17 PROCEDURE — 96372 THER/PROPH/DIAG INJ SC/IM: CPT

## 2020-01-17 RX ORDER — TESTOSTERONE CYPIONATE 200 MG/ML
200 INJECTION, SOLUTION INTRAMUSCULAR
Refills: 0 | Status: COMPLETED | OUTPATIENT
Start: 2020-01-17

## 2020-01-17 RX ORDER — TESTOSTERONE CYPIONATE 200 MG/ML
200 INJECTION, SOLUTION INTRAMUSCULAR
Refills: 0 | Status: COMPLETED | COMMUNITY
Start: 2020-01-17 | End: 2020-01-17

## 2020-01-17 RX ADMIN — TESTOSTERONE CYPIONATE 0 MG/ML: 200 INJECTION INTRAMUSCULAR at 00:00

## 2020-02-03 ENCOUNTER — RX RENEWAL (OUTPATIENT)
Age: 35
End: 2020-02-03

## 2020-02-14 ENCOUNTER — APPOINTMENT (OUTPATIENT)
Dept: UROLOGY | Facility: CLINIC | Age: 35
End: 2020-02-14
Payer: COMMERCIAL

## 2020-02-14 PROCEDURE — 96372 THER/PROPH/DIAG INJ SC/IM: CPT

## 2020-02-14 RX ADMIN — TESTOSTERONE CYPIONATE 0 MG/ML: 200 INJECTION INTRAMUSCULAR at 00:00

## 2020-03-13 ENCOUNTER — APPOINTMENT (OUTPATIENT)
Dept: UROLOGY | Facility: CLINIC | Age: 35
End: 2020-03-13

## 2020-03-17 ENCOUNTER — RX RENEWAL (OUTPATIENT)
Age: 35
End: 2020-03-17

## 2020-04-27 ENCOUNTER — RX RENEWAL (OUTPATIENT)
Age: 35
End: 2020-04-27

## 2020-05-07 ENCOUNTER — APPOINTMENT (OUTPATIENT)
Dept: NEUROLOGY | Facility: CLINIC | Age: 35
End: 2020-05-07
Payer: COMMERCIAL

## 2020-05-07 VITALS — HEIGHT: 69 IN | BODY MASS INDEX: 30.51 KG/M2 | WEIGHT: 206 LBS

## 2020-05-07 PROCEDURE — 99214 OFFICE O/P EST MOD 30 MIN: CPT | Mod: 95

## 2020-05-07 NOTE — PHYSICAL EXAM
[FreeTextEntry1] : Examination:\par Patient is well appearing\par Neurological Examination:\par Mental Status: Awake, alert. Oriented to person, place, time\par Language: No aphasia\par Cranial Nerves:\par  EOMI, No facial weakness, tongue protrudes in the midline\par Motor Exam: No pronator drift. Barrel roll intact. Fine motor movements intact in hands\par Able to stand up from chair without difficulty\par Sensory: intact on self exam\par Reflexes: Unable to examine\par Cerebellar: Finger to nose intact bilaterally\par \par \par

## 2020-05-07 NOTE — HISTORY OF PRESENT ILLNESS
[Home] : at home, [unfilled] , at the time of the visit. [Medical Office: ([facility])___] : at the medical office located in  [Patient] : the patient [Self] : self [FreeTextEntry1] : \par This is a telehealth visit that was performed with the originating site at the patient’s home and the distant site of my office at [address] [place] [address].\par Two way audio and visual technology was used. \par Verbal consent to participate in the telephone/video visit was obtained in lieu of in-person signature due to the coronavirus emergency.\par This particular visit occurred during the 2020 COVID-19 emergency. I discussed with the patient the nature of our telehealth visits, that:\par -I would evaluate the patient and recommend diagnostics and treatments based on my assessment.\par -Our sessions are not being recorded.\par -The patient acknowledged the risk of unsecure transmission of his/her information.\par -Our team would provide follow up care in person if/when the patient needs it.\par The [employer] application was unable to connect so [employer] was used.\par \par Mr. Sainz was last seen in the office on 11/22/19.\par he reports that the Concerta definitely helps with concentration.\par However, he still finds that he is forgetful.\par Last week his wife asked him to put something in the fridge and he forgot to do so.\par \par He does have some appetite suppression with Concerta and sometimes feels jittery if he does not drink enough water.\par \par He had blood work done a few months ago. He is not sure if it included TSH and vitamin B12.\par \par He says that he is sleeping for about 7-8 hours per night.\par His wife tells him that he only snores when on his back. He usually sleeps on his stomach. \par \par

## 2020-05-07 NOTE — DATA REVIEWED
[de-identified] : MRI brain noncontrast 11/16/19: A T2/FLAIR hyperintense and T1 slightly hyperintense 10x9x8 mm lesion in the left occipital lobe with possible tentorial attachment. Although nonspecific, finding is most suggestive for meningioma. Recommended dedicated MRI with and without contrast to further evaluate. [de-identified] : EEG 11/13/19: normal [de-identified] : Neurotrax 11/22/19:\par Global cognitive score 103.5\par Memory 103.3\par executive function 111\par attention 91.6\par information processing speed 88.4\par visual spatial 120.5\par verbal function 109.3\par motor skills 100.6\par \par below average in attention and information processing speed. Above average in all other domains.

## 2020-05-07 NOTE — DISCUSSION/SUMMARY
[FreeTextEntry1] : Mr. Sainz is a 34 year old man with a history of ADD in childhood who now reports having memory problems for one year.\par \par Despite his continued complaints of memory disturbance, neurotrax results are quite suggestive of ADHD.\par EEG is unremarkable. \par MRI brain with possible meningioma - awaiting repeat study with contrast. He lost prescription. Will send new prescription. \par \par Continue Concerta 36 mg/day.\par Last filled rx on 4/30/20.\par \par Continue good sleep, exercise, healthy diet.\par \par Will request blood test results from Dr. Mccollum's office. If B12 and TSH were not included, will order these to look for other causes of his memory complaint.\par \par f/u within 6 months, sooner if needed.

## 2020-05-12 ENCOUNTER — APPOINTMENT (OUTPATIENT)
Dept: UROLOGY | Facility: CLINIC | Age: 35
End: 2020-05-12
Payer: COMMERCIAL

## 2020-05-12 VITALS — TEMPERATURE: 98 F

## 2020-05-12 PROCEDURE — 96372 THER/PROPH/DIAG INJ SC/IM: CPT

## 2020-05-12 RX ADMIN — TESTOSTERONE CYPIONATE 0 MG/ML: 200 INJECTION, SOLUTION INTRAMUSCULAR at 00:00

## 2020-05-13 RX ORDER — SYRINGE WITH NEEDLE, 1 ML 25GX5/8"
22G X 1-1/2" SYRINGE, EMPTY DISPOSABLE MISCELLANEOUS
Qty: 6 | Refills: 3 | Status: ACTIVE | COMMUNITY
Start: 2020-05-12 | End: 1900-01-01

## 2020-06-01 ENCOUNTER — RX RENEWAL (OUTPATIENT)
Age: 35
End: 2020-06-01

## 2020-06-25 ENCOUNTER — APPOINTMENT (OUTPATIENT)
Dept: UROLOGY | Facility: CLINIC | Age: 35
End: 2020-06-25
Payer: COMMERCIAL

## 2020-06-25 PROCEDURE — 96372 THER/PROPH/DIAG INJ SC/IM: CPT

## 2020-06-25 RX ADMIN — Medication 0 MG/ML: at 00:00

## 2020-07-10 ENCOUNTER — RX RENEWAL (OUTPATIENT)
Age: 35
End: 2020-07-10

## 2020-07-20 ENCOUNTER — APPOINTMENT (OUTPATIENT)
Dept: UROLOGY | Facility: CLINIC | Age: 35
End: 2020-07-20
Payer: COMMERCIAL

## 2020-07-20 PROCEDURE — 96372 THER/PROPH/DIAG INJ SC/IM: CPT

## 2020-07-20 RX ADMIN — TESTOSTERONE CYPIONATE 0 MG/ML: 200 INJECTION, SOLUTION INTRAMUSCULAR at 00:00

## 2020-08-17 ENCOUNTER — APPOINTMENT (OUTPATIENT)
Dept: UROLOGY | Facility: CLINIC | Age: 35
End: 2020-08-17
Payer: COMMERCIAL

## 2020-08-17 PROCEDURE — 96372 THER/PROPH/DIAG INJ SC/IM: CPT

## 2020-08-17 RX ADMIN — TESTOSTERONE CYPIONATE 0 MG/ML: 200 INJECTION INTRAMUSCULAR at 00:00

## 2020-08-18 ENCOUNTER — RX RENEWAL (OUTPATIENT)
Age: 35
End: 2020-08-18

## 2020-09-14 ENCOUNTER — APPOINTMENT (OUTPATIENT)
Dept: UROLOGY | Facility: CLINIC | Age: 35
End: 2020-09-14

## 2020-09-18 ENCOUNTER — APPOINTMENT (OUTPATIENT)
Dept: UROLOGY | Facility: CLINIC | Age: 35
End: 2020-09-18

## 2020-10-05 ENCOUNTER — APPOINTMENT (OUTPATIENT)
Dept: UROLOGY | Facility: CLINIC | Age: 35
End: 2020-10-05
Payer: COMMERCIAL

## 2020-10-05 ENCOUNTER — APPOINTMENT (OUTPATIENT)
Dept: UROLOGY | Facility: CLINIC | Age: 35
End: 2020-10-05

## 2020-10-05 PROCEDURE — 96372 THER/PROPH/DIAG INJ SC/IM: CPT

## 2020-10-08 ENCOUNTER — RECORD ABSTRACTING (OUTPATIENT)
Age: 35
End: 2020-10-08

## 2020-10-08 RX ORDER — SILDENAFIL 20 MG/1
20 TABLET ORAL
Qty: 90 | Refills: 3 | Status: DISCONTINUED | COMMUNITY
Start: 2020-10-05 | End: 2020-10-08

## 2020-10-08 RX ORDER — SILDENAFIL 25 MG/1
25 TABLET ORAL
Qty: 6 | Refills: 6 | Status: ACTIVE | COMMUNITY
Start: 2020-10-08 | End: 1900-01-01

## 2020-10-19 LAB — TESTOST SERPL-MCNC: 714 NG/DL

## 2020-10-30 ENCOUNTER — RX RENEWAL (OUTPATIENT)
Age: 35
End: 2020-10-30

## 2020-11-02 ENCOUNTER — APPOINTMENT (OUTPATIENT)
Dept: UROLOGY | Facility: CLINIC | Age: 35
End: 2020-11-02

## 2020-11-09 ENCOUNTER — APPOINTMENT (OUTPATIENT)
Dept: UROLOGY | Facility: CLINIC | Age: 35
End: 2020-11-09

## 2020-11-18 NOTE — ED PROVIDER NOTE - RESPIRATORY [+], MLM
[facility] Cardiothoracic Surgery Office Follow Up Note     Date of Encounter: 11/18/2020     MRN Number: [id number]  Name: Maximo Kwon  Phone Number: [phone]     Referred By: Cecile Martin P*  PCP: Zachariah Shay APRN    Chief Complaint:    Chief Complaint   Patient presents with   • Lung Nodule     1 yr follow up with CT chest.        History of Present Illness:    Maximo Kwon is a 64 y.o. male with a history of HTN, HLP, DM2, former tobacco use, COPD and multiple pulmonary nodules s/p attempted biopsy with subsequent PTX in [place].  He did undergo Transbronchial biopsy of RUL/RLL 5/15/19 with Dr. Bethea and negative for malignancy.  He has been referred back to our clinic by ROSE Martin PA-C/Dr. Bethea for recommendations of enlarging posterior right lung nodule.  CT lung nodule monitoring at [facility] since 5/2018.  Recent CT chest without contrast 10/12/20.  Radiologist read as 3 right lung nodules with two stable.  Right posterior lung with spiculated nodule with increase size from 8.4 mm to 9.2 mm with last CT scan 6/23/20.  Denies any unexplained weight loss, hemoptysis, cough, SOA, fevers, chills or fatigue.  History of tob use and quit 10 years ago.    Review of Systems:  Review of Systems   Constitution: Negative for chills, fever, malaise/fatigue, night sweats and weight loss.   HENT: Negative for hearing loss, odynophagia and sore throat.    Cardiovascular: Negative for chest pain, dyspnea on exertion, leg swelling, orthopnea and palpitations.   Respiratory: Negative for cough and hemoptysis.    Endocrine: Negative for cold intolerance, heat intolerance, polydipsia, polyphagia and polyuria.   Hematologic/Lymphatic: Bruises/bleeds easily.   Skin: Negative for itching and rash.   Musculoskeletal: Negative for joint pain, joint swelling and myalgias.   Gastrointestinal: Negative for abdominal pain, constipation, diarrhea, hematemesis, hematochezia, melena, nausea and vomiting.    Genitourinary: Negative for dysuria, frequency and hematuria.   Neurological: Positive for numbness (in legs) and paresthesias (in legs). Negative for focal weakness, headaches and seizures.   Psychiatric/Behavioral: Negative for suicidal ideas.   All other systems reviewed and are negative.      I have reviewed the review of systems as entered by my clinical support staff and have updated it as appropriate.       Allergies:  No Known Allergies    Medications:      Current Outpatient Medications:   •  albuterol (PROVENTIL) (2.5 MG/3ML) 0.083% nebulizer solution, Take 2.5 mg by nebulization 4 (Four) Times a Day As Needed for Wheezing., Disp: , Rfl:   •  amLODIPine (NORVASC) 5 MG tablet, Take 5 mg by mouth Daily., Disp: , Rfl:   •  budesonide-formoterol (SYMBICORT) 160-4.5 MCG/ACT inhaler, Inhale 2 puffs 2 (Two) Times a Day., Disp: 1 inhaler, Rfl: 11  •  dilTIAZem CD (CARDIZEM CD) 120 MG 24 hr capsule, Take 1 capsule by mouth Daily., Disp: 90 capsule, Rfl: 1  •  glyBURIDE (DIAbeta) 5 MG tablet, Take 5 mg by mouth Daily With Breakfast., Disp: , Rfl:   •  hydrochlorothiazide (HYDRODIURIL) 25 MG tablet, Take 25 mg by mouth Daily., Disp: , Rfl:   •  levETIRAcetam (KEPPRA) 750 MG tablet, Take 375 mg by mouth 2 (Two) Times a Day. Prior to [facility] Admission, Patient was on: 1/2 to 1 tablet twice a day, Disp: , Rfl:   •  lisinopril (PRINIVIL,ZESTRIL) 20 MG tablet, Take 20 mg by mouth Daily., Disp: , Rfl:   •  meloxicam (MOBIC) 15 MG tablet, , Disp: , Rfl:   •  metFORMIN (GLUCOPHAGE) 500 MG tablet, Take 1,000 mg by mouth 2 (Two) Times a Day With Meals., Disp: , Rfl:   •  montelukast (SINGULAIR) 10 MG tablet, Take 1 tablet by mouth Every Night., Disp: 30 tablet, Rfl: 0  •  omeprazole (priLOSEC) 20 MG capsule, Take 20 mg by mouth Daily., Disp: , Rfl:   •  tiotropium bromide monohydrate (SPIRIVA RESPIMAT) 2.5 MCG/ACT aerosol solution inhaler, Inhale 2 puffs Daily., Disp: , Rfl:   •  tiZANidine (ZANAFLEX) 4 MG tablet, Take 4 mg  by mouth At Night As Needed for Muscle Spasms., Disp: , Rfl:   •  Omega-3 Fatty Acids (FISH OIL) 1000 MG capsule capsule, Take 1,000 mg by mouth Daily With Breakfast., Disp: , Rfl:     Social History     Socioeconomic History   • Marital status:      Spouse name: Not on file   • Number of children: 0   • Years of education: Not on file   • Highest education level: Not on file   Occupational History   • Occupation: DISABLED      Comment: DISABILITY FOR COPD   Tobacco Use   • Smoking status: Former Smoker     Packs/day: 1.00     Years: 45.00     Pack years: 45.00     Types: Cigarettes     Quit date:      Years since quittin.8   • Smokeless tobacco: Never Used   Substance and Sexual Activity   • Alcohol use: No   • Drug use: No   • Sexual activity: Defer     Birth control/protection: Other   Social History Narrative    LIVES IN [facility]       Family History   Problem Relation Age of Onset   • Alzheimer's disease Mother    • Cancer Father         THROAT CANCER   • Diabetes Sister    • Diabetes Brother        Past Medical History:   Diagnosis Date   • Arthritis    • COPD (chronic obstructive pulmonary disease) (CMS/HCC)    • Depression    • Diabetes mellitus (CMS/HCC)    • Diverticulitis    • Dyslipidemia    • Emphysema (subcutaneous) (surgical) resulting from a procedure    • GERD (gastroesophageal reflux disease)    • Hypertension    • Neuropathy    • Pneumothorax     Left, status post chest tube       Past Surgical History:   Procedure Laterality Date   • BRONCHOSCOPY N/A 2019    Procedure: Bronchoscopy with Transbronchial Biopsy with Fluoroscopy;  Surgeon: Eladio Bethea MD;  Location: [facility];  Service: Pulmonary   • CHEST TUBE INSERTION Left     Pneumothorax   • COLON RESECTION  2016    had colostomy and reveresed back    • COLONOSCOPY     • COLOSTOMY CLOSURE     • LUNG BIOPSY Right     (non cancerous)   • OTHER SURGICAL HISTORY Left     LEFT ELBOW SURGERY       Physical  "Exam:  Vital Signs:    Vitals:    20 1059   BP: 168/82   BP Location: Left arm   Patient Position: Sitting   Pulse: 76   Temp: 97.5 °F (36.4 °C)   SpO2: 98%   Weight: 99.4 kg (219 lb 3.2 oz)   Height: 188 cm (74\")     Body mass index is 28.14 kg/m².     Physical Exam  Vitals signs and nursing note reviewed.   Constitutional:       Appearance: Normal appearance. He is well-developed and well-groomed.   HENT:      Head: Normocephalic and atraumatic.   Neck:      Musculoskeletal: Neck supple.   Cardiovascular:      Rate and Rhythm: Normal rate and regular rhythm.      Heart sounds: Normal heart sounds, S1 normal and S2 normal. No murmur. No friction rub.   Pulmonary:      Comments: Unlabored, Clear to auscultation bilaterally  Abdominal:      General: Bowel sounds are normal.      Palpations: Abdomen is soft.      Tenderness: There is no abdominal tenderness.   Musculoskeletal:      Right lower leg: No edema.      Left lower leg: No edema.   Skin:     General: Skin is warm and dry.   Neurological:      Mental Status: He is alert and oriented to person, place, and time.   Psychiatric:         Attention and Perception: Attention normal.         Mood and Affect: Mood normal.         Speech: Speech normal.         Behavior: Behavior is cooperative.         Labs/Imagin/2019 CT chest without contrast:  On the lung windows, there continue to be moderate  emphysematous changes in the lungs with blebs and bullae involving both  lungs. There is a persistent lung nodule in the right lung on today's  study, it measures 8.3 mm in diameter. This is larger than on the  earlier CT. It remains sharply marginated. There is a second nodule in  the right lower lobe, it previously measured 6.2 mm and currently  measures 7.2. There were no inflammatory changes or pleural fluid. No  enlarged lymph nodes have developed in the mediastinum or hilar areas.     IMPRESSION:  There are 2 nodules in the right lung, both have " increased  slightly in size comparing with the previous CT from August 2018. No new  nodules have developed. There is no evidence of adenopathy in the  mediastinum.     6/23/20 CT chest without contrast:LUNGS: Parenchymal nodules in the right lung are as below:  Nodule anteriorly on image 47 of the axial series is stable.  Spiculated density posteriorly in the right lung on image 49 is stable.  Nodule posterolateral in the right lung on image 64 of the axial series  demonstrating slight interval growth from 6.8 to 7.6 mm.    Ct Chest Without Contrast    Result Date: 10/12/2020   1. COPD. 2. Parenchymal nodules in the right lung. There is one of the nodules which does show very slight interval growth. No new nodules are seen.   This report was finalized on 10/12/2020 1:05 PM by Dr. Sarwat Peñaloza MD.      Personally/Dr. Rehman reviewed     Assessment / Plan:  Diagnoses and all orders for this visit:    1. Multiple pulmonary nodules (Primary)     Maximo Kwon is a 64 y.o. male with a history of HTN, HLP, DM2, former tobacco use, COPD and multiple pulmonary nodules s/p attempted biopsy with subsequent PTX in [place].  He did undergo Transbronchial biopsy of RUL/RLL 5/15/19 with Dr. Bethea and negative for malignancy.  CT scans reviewed by Dr. Rehman.  Right posterior lung nodule does not appear spiculated on review.  Would be difficult to biopsy secondary to location.  At this time, Dr. Rehman would recommend follow up CT chest with contrast in 6 months and for presentation at [facility] tumor board with Dr. Bethea for further discussion.  Discussed plan with pt and he is agreeable.  Will follow up in 6 months following CT chest with contrast or earlier as needed.    OSITO Magana  [facility] Cardiothoracic Surgery    Please note that portions of this note may have been completed with a voice recognition program. Efforts were made to edit the dictations, but occasionally words are mistranscribed.    SHORTNESS OF BREATH

## 2020-12-08 ENCOUNTER — APPOINTMENT (OUTPATIENT)
Dept: UROLOGY | Facility: CLINIC | Age: 35
End: 2020-12-08
Payer: COMMERCIAL

## 2020-12-08 PROCEDURE — 99072 ADDL SUPL MATRL&STAF TM PHE: CPT

## 2020-12-08 PROCEDURE — 96372 THER/PROPH/DIAG INJ SC/IM: CPT

## 2020-12-11 ENCOUNTER — RX RENEWAL (OUTPATIENT)
Age: 35
End: 2020-12-11

## 2021-01-06 ENCOUNTER — APPOINTMENT (OUTPATIENT)
Dept: UROLOGY | Facility: CLINIC | Age: 36
End: 2021-01-06
Payer: COMMERCIAL

## 2021-01-06 VITALS
OXYGEN SATURATION: 97 % | DIASTOLIC BLOOD PRESSURE: 84 MMHG | HEIGHT: 69 IN | BODY MASS INDEX: 31.1 KG/M2 | WEIGHT: 210 LBS | SYSTOLIC BLOOD PRESSURE: 124 MMHG | HEART RATE: 77 BPM

## 2021-01-06 PROCEDURE — 99072 ADDL SUPL MATRL&STAF TM PHE: CPT

## 2021-01-06 PROCEDURE — 96372 THER/PROPH/DIAG INJ SC/IM: CPT

## 2021-01-29 ENCOUNTER — RX RENEWAL (OUTPATIENT)
Age: 36
End: 2021-01-29

## 2021-02-09 ENCOUNTER — APPOINTMENT (OUTPATIENT)
Dept: UROLOGY | Facility: CLINIC | Age: 36
End: 2021-02-09
Payer: COMMERCIAL

## 2021-02-09 PROCEDURE — 96372 THER/PROPH/DIAG INJ SC/IM: CPT

## 2021-02-09 PROCEDURE — 99072 ADDL SUPL MATRL&STAF TM PHE: CPT

## 2021-02-09 RX ADMIN — TESTOSTERONE CYPIONATE 0 MG/ML: 100 INJECTION, SOLUTION INTRAMUSCULAR at 00:00

## 2021-03-08 ENCOUNTER — APPOINTMENT (OUTPATIENT)
Dept: UROLOGY | Facility: CLINIC | Age: 36
End: 2021-03-08
Payer: COMMERCIAL

## 2021-03-08 PROCEDURE — 99072 ADDL SUPL MATRL&STAF TM PHE: CPT

## 2021-03-08 PROCEDURE — 96372 THER/PROPH/DIAG INJ SC/IM: CPT

## 2021-03-08 RX ADMIN — TESTOSTERONE CYPIONATE 0 MG/ML: 100 INJECTION, SOLUTION INTRAMUSCULAR at 00:00

## 2021-03-10 ENCOUNTER — RX RENEWAL (OUTPATIENT)
Age: 36
End: 2021-03-10

## 2021-04-12 ENCOUNTER — APPOINTMENT (OUTPATIENT)
Dept: UROLOGY | Facility: CLINIC | Age: 36
End: 2021-04-12
Payer: COMMERCIAL

## 2021-04-12 PROCEDURE — 99072 ADDL SUPL MATRL&STAF TM PHE: CPT

## 2021-04-12 PROCEDURE — 96372 THER/PROPH/DIAG INJ SC/IM: CPT

## 2021-04-12 RX ADMIN — TESTOSTERONE CYPIONATE 0 MG/ML: 100 INJECTION, SOLUTION INTRAMUSCULAR at 00:00

## 2021-04-21 ENCOUNTER — APPOINTMENT (OUTPATIENT)
Dept: NEUROLOGY | Facility: CLINIC | Age: 36
End: 2021-04-21
Payer: COMMERCIAL

## 2021-04-21 VITALS
DIASTOLIC BLOOD PRESSURE: 80 MMHG | SYSTOLIC BLOOD PRESSURE: 118 MMHG | TEMPERATURE: 97.5 F | HEIGHT: 69 IN | BODY MASS INDEX: 31.1 KG/M2 | WEIGHT: 210 LBS | HEART RATE: 78 BPM

## 2021-04-21 PROCEDURE — 99072 ADDL SUPL MATRL&STAF TM PHE: CPT

## 2021-04-21 PROCEDURE — 99213 OFFICE O/P EST LOW 20 MIN: CPT

## 2021-04-21 NOTE — DISCUSSION/SUMMARY
[FreeTextEntry1] : Mr. Sainz is a 34 year old man with a history of ADD in childhood with reports of memory problems for one year prior to initial visit in october 2020.\par \par Despite his continued complaints of memory disturbance, neurotrax results are quite suggestive of ADHD.\par EEG is unremarkable. \par \par \par Restart Methylphenidate ER 36 mg/day.\par Last filled 2/5/21.\par Rx sent to mail order pharmacy. If not accepted will resend to local pharmacy.\par \par Continue good sleep, exercise, healthy diet.\par \par His wife reports that he kicks things/steps on things as if he does not see objects in front of him.\par MRI brain with possible meningioma.\par Repeat MRI brain with contrast.\par \par \par Patient advised that he needs to be seen at least once every six months for prescriptions on controlled substances.\par f/u within 6 months, sooner if needed.

## 2021-04-21 NOTE — DATA REVIEWED
[de-identified] : MRI brain noncontrast 11/16/19: A T2/FLAIR hyperintense and T1 slightly hyperintense 10x9x8 mm lesion in the left occipital lobe with possible tentorial attachment. Although nonspecific, finding is most suggestive for meningioma. Recommended dedicated MRI with and without contrast to further evaluate. [de-identified] : EEG 11/13/19: normal [de-identified] : Neurotrax 11/22/19:\par Global cognitive score 103.5\par Memory 103.3\par executive function 111\par attention 91.6\par information processing speed 88.4\par visual spatial 120.5\par verbal function 109.3\par motor skills 100.6\par \par below average in attention and information processing speed. Above average in all other domains.

## 2021-04-21 NOTE — HISTORY OF PRESENT ILLNESS
[FreeTextEntry1] : I last saw Mr. Sainz on 5/7/20.\par \par He is working in construction.\par His five year old daughter is in pre-school and his younger child is at home.\par \par He reports that he can still be forgetful but overall is doing well.\par His wife will ask him to get something from another room and he will forget.\par \par He has not had a refill on methylphenidate since 2/5/21.\par He has not had meds over the last month and notices a big difference.\par He finds that he is less focused.\par \par When taking methylphenidate he had some appetite suppression.\par He was sleeping well. \par His wife notices that he is fidgety in bed over the last month.

## 2021-05-06 NOTE — ED ADULT NURSE NOTE - CAS TRG GENERAL NORM CIRC DET
29 yo  at 36 1/7 weeks, routine PNV  Denies leakage of fluid, vaginal bleeding, contractions  Good fetal movement    GBS collected today  SVE closed/long/high  Reviewed earliest eIOL 39 weeks  Precautions reviewed  routine 1 week follow up Strong peripheral pulses

## 2021-05-12 ENCOUNTER — APPOINTMENT (OUTPATIENT)
Dept: UROLOGY | Facility: CLINIC | Age: 36
End: 2021-05-12
Payer: COMMERCIAL

## 2021-05-12 PROCEDURE — 99072 ADDL SUPL MATRL&STAF TM PHE: CPT

## 2021-05-12 PROCEDURE — 96372 THER/PROPH/DIAG INJ SC/IM: CPT

## 2021-05-12 RX ADMIN — TESTOSTERONE CYPIONATE 0 MG/ML: 200 INJECTION, SOLUTION INTRAMUSCULAR at 00:00

## 2021-05-22 LAB
HCT VFR BLD CALC: 44.1 %
HGB BLD-MCNC: 14.3 G/DL
TESTOST SERPL-MCNC: 879 NG/DL

## 2021-05-24 ENCOUNTER — NON-APPOINTMENT (OUTPATIENT)
Age: 36
End: 2021-05-24

## 2021-05-24 DIAGNOSIS — R44.2 OTHER HALLUCINATIONS: ICD-10-CM

## 2021-06-14 ENCOUNTER — APPOINTMENT (OUTPATIENT)
Dept: UROLOGY | Facility: CLINIC | Age: 36
End: 2021-06-14
Payer: COMMERCIAL

## 2021-06-14 PROCEDURE — 96372 THER/PROPH/DIAG INJ SC/IM: CPT

## 2021-06-14 PROCEDURE — 99072 ADDL SUPL MATRL&STAF TM PHE: CPT

## 2021-06-14 RX ORDER — TESTOSTERONE CYPIONATE 200 MG/ML
200 INJECTION, SOLUTION INTRAMUSCULAR
Refills: 0 | Status: COMPLETED | OUTPATIENT
Start: 2021-06-14

## 2021-06-14 RX ORDER — TESTOSTERONE CYPIONATE 200 MG/ML
200 INJECTION, SOLUTION INTRAMUSCULAR
Refills: 0 | Status: COMPLETED | COMMUNITY
Start: 2021-06-14 | End: 2021-06-14

## 2021-07-13 ENCOUNTER — APPOINTMENT (OUTPATIENT)
Dept: UROLOGY | Facility: CLINIC | Age: 36
End: 2021-07-13

## 2021-10-21 ENCOUNTER — APPOINTMENT (OUTPATIENT)
Dept: NEUROLOGY | Facility: CLINIC | Age: 36
End: 2021-10-21
Payer: COMMERCIAL

## 2021-10-21 VITALS
HEIGHT: 69 IN | SYSTOLIC BLOOD PRESSURE: 124 MMHG | WEIGHT: 210 LBS | DIASTOLIC BLOOD PRESSURE: 74 MMHG | TEMPERATURE: 97.8 F | HEART RATE: 74 BPM | BODY MASS INDEX: 31.1 KG/M2

## 2021-10-21 PROCEDURE — 99213 OFFICE O/P EST LOW 20 MIN: CPT

## 2021-10-21 NOTE — HISTORY OF PRESENT ILLNESS
[FreeTextEntry1] : I last saw Mr. Sainz on 4/21/21.\par \par He notices some short term memory issues.\par He finds himself walking into a room and forgetting what he was supposed to do.\par \par He is sleeping well.\par He sleeps for about 7.5 hours per night but does wake up at 4 AM for work.\par \par He finds that the medication does help with focus.\par However, even with increased focus he is forgetful.\par He denies having any side effects from methylphenidate.\par He initially had some appetite suppression but this has faded.\par He takes methylphenidate every day.\par \par His wife has reported snoring on his back.\par \par He has a bump on his right posterior head which is tender.

## 2021-10-21 NOTE — DISCUSSION/SUMMARY
[FreeTextEntry1] : Mr. Sainz is a 34 year old man with a history of ADD in childhood with reports of memory problems for one year prior to initial visit in October 2019..\par \par Neurotrax results were suggestive of ADHD.\par EEG is unremarkable. \par \par Attention Disturbance:\par -Continue methylphenidate ER 36 mg/day\par -Can skip doses on weekends\par -Continue to use non-medication strategies to help with attention.\par -Good sleep, exercise, healthy diet.\par \par Memory disturbance:\par -Will request blood tests from Dr. Mccollum. If TFTs and vitamin B12 not checked, will check\par -Consider sleep study, but snoring only reported when supine and he mostly sleeps prone.\par \par Meningioma\par -Stable left meningioma. Repeat MRI in April.\par -He reports tenderness on right side of head which is a different lesion. \par -He would like to consult with neurosurgery. Given Dr. Clinton's information.\par \par \par \par f/u within 6 months, sooner if needed.

## 2021-10-21 NOTE — PHYSICAL EXAM
[FreeTextEntry1] : Examination:\par Constitutional: normal, no apparent distress\par Eyes: normal conjunctiva b/l, no ptosis, visual fields full\par Respiratory: no respiratory distress, normal effort, normal auscultation\par Cardiovascular: normal rate, rhythm, no murmurs\par Neck: supple, no masses\par Vascular: carotids normal\par Skin: normal color, no rashes\par Psych: normal mood, affect\par head: tender subcutaneous lesion over right occipital area\par \par Neurological:\par Memory: normal memory, oriented to person, place, time\par Language intact/no aphasia\par Cranial Nerves: II-XII intact, Pupils equally round and reactive to light, ocular muscles/movements intact, no ptosis, no facial weakness, tongue protrudes normally in the midline, \par Motor: normal tone, no pronator drift, full strength in all four extremities in the proximal and distal muscle groups\par Coordination: Fine motor movements intact, rapid alternating movements intact, finger to nose intact bilaterally\par Sensory: intact to light touch\par DTRs: symmetric, normal\par Gait: narrow based, steady\par

## 2021-10-21 NOTE — DATA REVIEWED
[de-identified] : MRI brain noncontrast 11/16/19: A T2/FLAIR hyperintense and T1 slightly hyperintense 10x9x8 mm lesion in the left occipital lobe with possible tentorial attachment. Although nonspecific, finding is most suggestive for meningioma. Recommended dedicated MRI with and without contrast to further evaluate.\par \par MRI brain with and without contrast 4/26/21:\par No significant interval change in left tentorial meningioma.\par  [de-identified] : EEG 11/13/19: normal [de-identified] : Neurotrax 11/22/19:\par Global cognitive score 103.5\par Memory 103.3\par executive function 111\par attention 91.6\par information processing speed 88.4\par visual spatial 120.5\par verbal function 109.3\par motor skills 100.6\par \par below average in attention and information processing speed. Above average in all other domains.

## 2021-10-21 NOTE — CONSULT LETTER
[Dear  ___] : Dear  [unfilled], [Consult Letter:] : I had the pleasure of evaluating your patient, [unfilled]. [Please see my note below.] : Please see my note below. [Consult Closing:] : Thank you very much for allowing me to participate in the care of this patient.  If you have any questions, please do not hesitate to contact me. [FreeTextEntry2] : Sammy Mccollum [FreeTextEntry3] : Sincerely,\par \par \par Drea Hemphill MD\par Diplomate, American Academy of Psychiatry and Neurology\par Board Certified in the Subspecialty of Clinical Neurophysiology\par Board Certified in the Subspecialty of Sleep Medicine\par Board Certified in the Subspecialty of Epilepsy\par

## 2021-11-01 ENCOUNTER — APPOINTMENT (OUTPATIENT)
Dept: UROLOGY | Facility: CLINIC | Age: 36
End: 2021-11-01

## 2022-01-12 ENCOUNTER — APPOINTMENT (OUTPATIENT)
Dept: UROLOGY | Facility: CLINIC | Age: 37
End: 2022-01-12
Payer: COMMERCIAL

## 2022-01-12 VITALS
HEIGHT: 69 IN | WEIGHT: 260 LBS | SYSTOLIC BLOOD PRESSURE: 140 MMHG | HEART RATE: 89 BPM | OXYGEN SATURATION: 99 % | BODY MASS INDEX: 38.51 KG/M2 | DIASTOLIC BLOOD PRESSURE: 83 MMHG

## 2022-01-12 PROCEDURE — 96372 THER/PROPH/DIAG INJ SC/IM: CPT

## 2022-01-13 LAB
HCT VFR BLD CALC: 43.3 %
HGB BLD-MCNC: 14.2 G/DL
TESTOST SERPL-MCNC: 227 NG/DL

## 2022-01-30 ENCOUNTER — EMERGENCY (EMERGENCY)
Facility: HOSPITAL | Age: 37
LOS: 0 days | Discharge: HOME | End: 2022-01-30
Attending: EMERGENCY MEDICINE
Payer: COMMERCIAL

## 2022-01-30 VITALS
HEIGHT: 69 IN | RESPIRATION RATE: 18 BRPM | SYSTOLIC BLOOD PRESSURE: 143 MMHG | WEIGHT: 220.02 LBS | TEMPERATURE: 98 F | OXYGEN SATURATION: 99 % | DIASTOLIC BLOOD PRESSURE: 96 MMHG | HEART RATE: 92 BPM
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
## 2022-01-30 DIAGNOSIS — F90.9 ATTENTION-DEFICIT HYPERACTIVITY DISORDER, UNSPECIFIED TYPE: ICD-10-CM
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
## 2022-01-30 DIAGNOSIS — Z88.0 ALLERGY STATUS TO PENICILLIN: ICD-10-CM

## 2022-01-30 DIAGNOSIS — Z88.1 ALLERGY STATUS TO OTHER ANTIBIOTIC AGENTS STATUS: ICD-10-CM

## 2022-01-30 DIAGNOSIS — E78.5 HYPERLIPIDEMIA, UNSPECIFIED: ICD-10-CM

## 2022-01-30 DIAGNOSIS — M25.572 PAIN IN LEFT ANKLE AND JOINTS OF LEFT FOOT: ICD-10-CM

## 2022-01-30 DIAGNOSIS — S93.402A SPRAIN OF UNSPECIFIED LIGAMENT OF LEFT ANKLE, INITIAL ENCOUNTER: ICD-10-CM

## 2022-01-30 PROCEDURE — 73590 X-RAY EXAM OF LOWER LEG: CPT | Mod: LT

## 2022-01-30 PROCEDURE — 73610 X-RAY EXAM OF ANKLE: CPT | Mod: 26,LT

## 2022-01-30 PROCEDURE — 96372 THER/PROPH/DIAG INJ SC/IM: CPT

## 2022-01-30 PROCEDURE — 99284 EMERGENCY DEPT VISIT MOD MDM: CPT

## 2022-01-30 PROCEDURE — 73590 X-RAY EXAM OF LOWER LEG: CPT | Mod: 26,LT

## 2022-01-30 PROCEDURE — 73610 X-RAY EXAM OF ANKLE: CPT | Mod: LT

## 2022-01-30 PROCEDURE — 99284 EMERGENCY DEPT VISIT MOD MDM: CPT | Mod: 25

## 2022-01-30 RX ORDER — KETOROLAC TROMETHAMINE 30 MG/ML
15 SYRINGE (ML) INJECTION ONCE
Refills: 0 | Status: DISCONTINUED | OUTPATIENT
Start: 2022-01-30 | End: 2022-01-30

## 2022-01-30 RX ADMIN — Medication 15 MILLIGRAM(S): at 17:35

## 2022-01-30 NOTE — ED ADULT NURSE NOTE - OBJECTIVE STATEMENT
Pt arrives to ED s/p mechanical slip down two steps. pt states he missed last steps, turning left ankle. complains of left ankle pain. denies medical history.No other complaints at this time.

## 2022-01-30 NOTE — ED ADULT TRIAGE NOTE - CHIEF COMPLAINT QUOTE
Pt arrives to ED s/p mechanical slip down two steps. pt states he missed last steps, turning left ankle. complains of left ankle pain. denies medical history.

## 2022-01-30 NOTE — ED STATDOCS - PATIENT PORTAL LINK FT
You can access the FollowMyHealth Patient Portal offered by [facility] by registering at the following website: [email or web address]. By joining [facility]’s FollowMyHealth portal, you will also be able to view your health information using other applications (apps) compatible with our system.

## 2022-01-30 NOTE — ED STATDOCS - NSFOLLOWUPINSTRUCTIONS_ED_ALL_ED_FT
Please follow up with your primary care provider for further concerns you may have regarding your general health. Attached you will find your results from today's visit. Continue taking your medications as prescribed and keep your upcoming medical appointments.    Your preliminary xray results are consistent with an ankle sprain without fracture. you'll be contacted should the final read be read differently.     Call Dr. Fisher's office to follow up in the next week - contact info above.     To control your pain at home, you should take Ibuprofen 400 mg along with Tylenol 650mg-1000mg every 6 to 8 hours. Limit your maximum daily Tylenol from all sources to 4000mg. Be aware that many other medications contain acetaminophen which is also known as Tylenol. Taking Tylenol and Ibuprofen together has been shown to be more effective at relieving pain than taking them separately. These are both over the counter medications that you can  at your local pharmacy without a prescription. You need to respect all of the warnings on the bottles. You shouldn’t take these medications for more than a week without following up with your doctor. Both medications come with certain risks and side effects that you need to discuss with your doctor, especially if you are taking them for a prolonged period.    Ankle Sprain  WHAT YOU NEED TO KNOW:  An ankle sprain happens when 1 or more ligaments in your ankle joint stretch or tear. Ligaments are tough tissues that connect bones. Ligaments support your joints and keep your bones in place.  DISCHARGE INSTRUCTIONS:  Seek care immediately if:   •You have severe pain in your ankle.  •Your foot or toes are cold or numb.  •Your ankle becomes more weak or unstable (wobbly).  •You are unable to put any weight on your ankle or foot.  •Your swelling has increased or returned.  Call your doctor if:   •Your pain does not go away, even after treatment.  •You have questions or concerns about your condition or care.  Medicines: You may need any of the following:   •NSAIDs, such as ibuprofen, help decrease swelling, pain, and fever. This medicine is available with or without a doctor's order. NSAIDs can cause stomach bleeding or kidney problems in certain people. If you take blood thinner medicine, always ask your healthcare provider if NSAIDs are safe for you. Always read the medicine label and follow directions.  •Acetaminophen decreases pain and fever. It is available without a doctor's order. Ask how much to take and how often to take it. Follow directions. Read the labels of all other medicines you are using to see if they also contain acetaminophen, or ask your doctor or pharmacist. Acetaminophen can cause liver damage if not taken correctly. Do not use more than 4 grams (4,000 milligrams) total of acetaminophen in one day.   •Prescription pain medicine may be given. Ask your healthcare provider how to take this medicine safely. Some prescription pain medicines contain acetaminophen. Do not take other medicines that contain acetaminophen without talking to your healthcare provider. Too much acetaminophen may cause liver damage. Prescription pain medicine may cause constipation. Ask your healthcare provider how to prevent or treat constipation.   •Take your medicine as directed. Contact your healthcare provider if you think your medicine is not helping or if you have side effects. Tell him or her if you are allergic to any medicine. Keep a list of the medicines, vitamins, and herbs you take. Include the amounts, and when and why you take them. Bring the list or the pill bottles to follow-up visits. Carry your medicine list with you in case of an emergency.  Self-care:   •Use support devices, such as a brace, cast, or splint, to limit your movement and protect your joint. You may need to use crutches to decrease your pain as you move around.  •Go to physical therapy as directed. A physical therapist teaches you exercises to help improve movement and strength, and to decrease pain.  •Rest your ankle so that it can heal. Return to normal activities as directed.  •Apply ice on your ankle for 15 to 20 minutes every hour or as directed. Use an ice pack, or put crushed ice in a plastic bag. Cover it with a towel. Ice helps prevent tissue damage and decreases swelling and pain.  •Compress your ankle. Ask if you should wrap an elastic bandage around your injured ligament. An elastic bandage provides support and helps decrease swelling and movement so your joint can heal. Wear as long as directed.  •Elevate your ankle above the level of your heart as often as you can. This will help decrease swelling and pain. Prop your ankle on pillows or blankets to keep it elevated comfortably.   Elevate Leg  Prevent another ankle sprain:   •Let your ankle heal. Find out how long your ligament needs to heal. Do not do any physical activity until your healthcare provider says it is okay. If you start activity too soon, you may develop a more serious injury.  •Always warm up and stretch before you exercise or play sports.  •Use the right equipment. Always wear shoes that fit well and are made for the activity that you are doing. You may also need ankle supports, elbow and knee pads, or braces.  Follow up with your doctor as directed: Write down your questions so you remember to ask them during your visits.

## 2022-01-30 NOTE — ED STATDOCS - CARE PROVIDER_API CALL
Smith Fisher (DO)  Orthopaedic Surgery  [address] [place] [address]  Phone: [phone]  Fax: [phone]  Follow Up Time: 4-6 Days

## 2022-01-30 NOTE — ED STATDOCS - ATTENDING CONTRIBUTION TO CARE
I, Sissy Quiñonez MD,  performed the initial face to face bedside interview with this patient regarding history of present illness, review of symptoms and relevant past medical, social and family history.  I completed an independent physical examination.  I was the initial provider who evaluated this patient. I have signed out the follow up of any pending tests (i.e. labs, radiological studies) to the resident.  I have communicated the patient’s plan of care and disposition with the resident.

## 2022-01-30 NOTE — ED STATDOCS - PHYSICAL EXAMINATION
Gen: WDWN, NAD  HEENT: EOMI, no nasal discharge, mucous membranes moist  CV: RRR  Resp: CTAB  MSK: + bruising and swelling to lateral aspect of ankle, + ttp w/ eversion of ankle with point medial malleolar ttp   Neuro: A&Ox4, following commands, moving all four extremities spontaneously  Psych: appropriate mood, affect

## 2022-01-30 NOTE — ED STATDOCS - OBJECTIVE STATEMENT
36M denies hx p/w ankle injury. Reports approx 1 hour pta to walking down stairs and mising final stair, turning left ankle and "hearing two pops" w/ immediate pain. Has not ambulated since incident. No trial of anti-inflammatories. No associated traumatic injury.

## 2022-01-30 NOTE — ED STATDOCS - NSICDXPASTMEDICALHX_GEN_ALL_CORE_FT
PAST MEDICAL HISTORY:  ADHD     Fatty liver     GERD (gastroesophageal reflux disease)     HLD (hyperlipidemia)     Vitamin B 12 deficiency

## 2022-01-30 NOTE — ED STATDOCS - PROGRESS NOTE DETAILS
pt seen and examined.  rolled L ankle, felt a pop, now painful to WB.  denies any further injury.  On exam TTP lateral mall.  no lateral foot TTP + ankle swelling.  XR no fx.  wrapped, follow up with ortho. Supportive measures and return precautions discussed.  MD Manisha

## 2022-01-31 ENCOUNTER — NON-APPOINTMENT (OUTPATIENT)
Age: 37
End: 2022-01-31

## 2022-02-02 ENCOUNTER — APPOINTMENT (OUTPATIENT)
Dept: ORTHOPEDIC SURGERY | Facility: CLINIC | Age: 37
End: 2022-02-02
Payer: COMMERCIAL

## 2022-02-02 ENCOUNTER — NON-APPOINTMENT (OUTPATIENT)
Age: 37
End: 2022-02-02

## 2022-02-02 PROCEDURE — 97760 ORTHOTIC MGMT&TRAING 1ST ENC: CPT

## 2022-02-02 PROCEDURE — 99204 OFFICE O/P NEW MOD 45 MIN: CPT

## 2022-02-02 NOTE — ADDENDUM
[FreeTextEntry1] : I, Yvette Kirby, acted solely as a scribe for Dr. Smith Fisher on this date 02/02/2022.\par \par All medical record entries made by the Scribe were at my, Dr. Smith Fisher, direction and personally dictated by me on 02/02/2022 . I have reviewed the chart and agree that the record accurately reflects my personal performance of the history, physical exam, assessment and plan. I have also personally directed, reviewed, and agreed with the chart.	\par

## 2022-02-02 NOTE — PHYSICAL EXAM
[de-identified] : General: Alert and oriented x3. In no acute distress. Pleasant in nature with a normal affect. No apparent respiratory distress.\par \par Left Ankle Exam\par Skin: Clean, dry, intact\par Inspection: No obvious malalignment, + swelling, no effusion; no lymphadenopathy\par Pulses: 2+ DP/PT pulses\par ROM: 10 degrees of dorsiflexion, 40 degrees of plantarflexion, 10 degrees of subtalar motion\par Tenderness: + Tenderness over the anterior ankle. + Tenderness over the lateral gutter. + CFL/ATFL/PTFL pain. No medial malleolus pain, no deltoid ligament pain. No proximal fibular pain. No heel pain.+ Tenderness over the distal fibula. \par Stability: Negative anterior/posterior drawer.\par Strength: 5/5 TA/GS/EHL\par Neuro: In tact to light touch throughout\par Additional tests: Negative Mortons test, Negative syndesmosis squeeze test. [de-identified] : ACC: [id number] EXAM: XR ANKLE COMP MIN 3 VIEWS LT\par ACC: [id number] EXAM: XR TIB FIB AP LAT 2 VIEWS LT\par \par PROCEDURE DATE: 01/30/2022\par \par \par \par INTERPRETATION: Left tib-fib and left ankle. Patient had local trauma.\par \par Left tib-fib. AP and lateral views. 4 images.\par \par The knee is unremarkable.\par \par Bones appear intact.\par \par Left ankle. 3 views.\par \par Slight degeneration of the medial malleolar area.\par \par No bone destruction or fracture.\par \par IMPRESSION: No acute finding.\par \par --- End of Report ---\par \par \par \par \par \par KINGA MOHAMUD MD; Attending Radiologist\par This document has been electronically signed. Jan 31 2022 9:25AM

## 2022-02-02 NOTE — DISCUSSION/SUMMARY
[de-identified] : Today I had a lengthy discussion with the patient regarding their left ankle injury. I have addressed all the patient's concerns surrounding the pathology of their condition. XR imaging results were reviewed with the patient. I recommended that the patient utilize a CAM boot. The patient was fitted for the CAM boot in the office today. The patient was educated about the boot wear pattern and utilization, as well as the timeframe to come out of the boot. He was also given full instructions for using the boot. I recommend that the patient utilize ice, NSAIDs, and heat PRN. They can also elevate their left ankle above the level of the heart. He may continue to utilize the crutches for ambulation assistance PRN. The patient understood and verbally agreed to the treatment plan. All of their questions were answered and they were satisfied with the visit. The patient should call the office if they have any questions or experience worsening symptoms. I would like to see the patient back in the office in 2 weeks to reassess their condition. 				\par Patient is 100% temporarily disabled and will be out of work for the next 4 weeks. \par

## 2022-02-02 NOTE — HISTORY OF PRESENT ILLNESS
[FreeTextEntry1] : 2/2/2022: The patient is a 36 year old male presenting for an initial evaluation of an left ankle injury sustained on 1/30/2022. The patient states that he missed a step and fell down the steps while at home. He was evaluated for this issue at  ED same time, where he obtained negative x-rays. His main concern today is numbness and constant pain to the anterior ankle. He is currently on Omeprazole and Concerta. Pain scale 8 out of 10, with a throbbing character. He has utilized NSAIDs for pain relief. The patient presents ambulating with crutches with his ankle wrapped in an ASO brace. 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  No other complaints.

## 2022-02-14 ENCOUNTER — APPOINTMENT (OUTPATIENT)
Dept: UROLOGY | Facility: CLINIC | Age: 37
End: 2022-02-14
Payer: COMMERCIAL

## 2022-02-14 PROCEDURE — 96372 THER/PROPH/DIAG INJ SC/IM: CPT

## 2022-02-16 ENCOUNTER — APPOINTMENT (OUTPATIENT)
Dept: ORTHOPEDIC SURGERY | Facility: CLINIC | Age: 37
End: 2022-02-16
Payer: COMMERCIAL

## 2022-02-16 DIAGNOSIS — S99.912D UNSPECIFIED INJURY OF LEFT ANKLE, SUBSEQUENT ENCOUNTER: ICD-10-CM

## 2022-02-16 DIAGNOSIS — S99.912A UNSPECIFIED INJURY OF LEFT ANKLE, INITIAL ENCOUNTER: ICD-10-CM

## 2022-02-16 PROCEDURE — 99212 OFFICE O/P EST SF 10 MIN: CPT

## 2022-02-16 NOTE — PHYSICAL EXAM
[de-identified] : General: Alert and oriented x3. In no acute distress. Pleasant in nature with a normal affect. No apparent respiratory distress.\par \par Left Ankle Exam\par Skin: Clean, dry, intact\par Inspection: No obvious malalignment, no swelling, no effusion; no lymphadenopathy\par Pulses: 2+ DP/PT pulses\par ROM: 10 degrees of dorsiflexion, 40 degrees of plantarflexion, 10 degrees of subtalar motion\par Tenderness: no Tenderness over the anterior ankle. + Improved tenderness over the lateral gutter. + Improved CFL/ATFL/PTFL pain. No medial malleolus pain, no deltoid ligament pain. No proximal fibular pain. No heel pain.+ Improved tenderness over the distal fibula. \par Stability: Negative anterior/posterior drawer.\par Strength: 5/5 TA/GS/EHL\par Neuro: In tact to light touch throughout\par Additional tests: Negative Mortons test, Negative syndesmosis squeeze test. [de-identified] : ACC: [id number] EXAM: XR ANKLE COMP MIN 3 VIEWS LT\par ACC: [id number] EXAM: XR TIB FIB AP LAT 2 VIEWS LT\par \par PROCEDURE DATE: 01/30/2022\par \par \par \par INTERPRETATION: Left tib-fib and left ankle. Patient had local trauma.\par \par Left tib-fib. AP and lateral views. 4 images.\par \par The knee is unremarkable.\par \par Bones appear intact.\par \par Left ankle. 3 views.\par \par Slight degeneration of the medial malleolar area.\par \par No bone destruction or fracture.\par \par IMPRESSION: No acute finding.\par \par --- End of Report ---\par \par \par \par \par \par KINGA MOHAMUD MD; Attending Radiologist\par This document has been electronically signed. Jan 31 2022 9:25AM

## 2022-02-16 NOTE — HISTORY OF PRESENT ILLNESS
[FreeTextEntry1] : 2/16/22: The patient is here for follow-up of his left ankle injury which she sustained on 1/30/2022.  The patient has minimal to no pain today in his ankle.  He presents wearing his long cam boot.  The patient would like to return to work as soon as possible.\par \par 2/2/2022: The patient is a 36 year old male presenting for an initial evaluation of an left ankle injury sustained on 1/30/2022. The patient states that he missed a step and fell down the steps while at home. He was evaluated for this issue at  ED same time, where he obtained negative x-rays. His main concern today is numbness and constant pain to the anterior ankle. He is currently on Omeprazole and Concerta. Pain scale 8 out of 10, with a throbbing character. He has utilized NSAIDs for pain relief. The patient presents ambulating with crutches with his ankle wrapped in an ASO brace. 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  No other complaints.

## 2022-02-16 NOTE — DISCUSSION/SUMMARY
[de-identified] : At this point time the patient come out of the long cam boot and he was given an ASO brace to transfer into.  He was given an outpatient physical therapy prescription to start some physical therapy for full ankle range of motion and strength.  He was given a return to work note.  He can follow-up as needed for his ankle.

## 2022-02-28 NOTE — ED ADULT NURSE NOTE - NS_HUMANTRAFFICKQ4_ED_ALL_ED
52 y/o M pt with hx of htn, depression, anxiety presenting today with c/o R knee pain x2 days. States he woke up 2 days ago with pain, and has been constant. Describes pain with ambulation, has not taken anything for pain. Denies any trauma, falls, heavy lifting. Pt denies any chest pain, dyspnea, fevers, n/v/d, abdominal pain, dysuria, headache, cough, congestion, sore throat, neck pain, back pain, weakness, numbness, tingling, dizziness, syncope, or other complaint. No

## 2022-03-14 ENCOUNTER — APPOINTMENT (OUTPATIENT)
Dept: UROLOGY | Facility: CLINIC | Age: 37
End: 2022-03-14
Payer: COMMERCIAL

## 2022-03-14 PROCEDURE — 96372 THER/PROPH/DIAG INJ SC/IM: CPT

## 2022-04-01 ENCOUNTER — APPOINTMENT (OUTPATIENT)
Dept: NEUROLOGY | Facility: CLINIC | Age: 37
End: 2022-04-01

## 2022-04-14 ENCOUNTER — APPOINTMENT (OUTPATIENT)
Dept: UROLOGY | Facility: CLINIC | Age: 37
End: 2022-04-14
Payer: COMMERCIAL

## 2022-04-14 PROCEDURE — 96372 THER/PROPH/DIAG INJ SC/IM: CPT

## 2022-05-16 ENCOUNTER — APPOINTMENT (OUTPATIENT)
Dept: UROLOGY | Facility: CLINIC | Age: 37
End: 2022-05-16

## 2022-06-14 ENCOUNTER — APPOINTMENT (OUTPATIENT)
Dept: UROLOGY | Facility: CLINIC | Age: 37
End: 2022-06-14
Payer: COMMERCIAL

## 2022-06-14 VITALS
BODY MASS INDEX: 33.62 KG/M2 | HEIGHT: 69 IN | HEART RATE: 76 BPM | OXYGEN SATURATION: 98 % | WEIGHT: 227 LBS | DIASTOLIC BLOOD PRESSURE: 82 MMHG | SYSTOLIC BLOOD PRESSURE: 146 MMHG

## 2022-06-14 PROCEDURE — 96372 THER/PROPH/DIAG INJ SC/IM: CPT

## 2022-07-14 ENCOUNTER — APPOINTMENT (OUTPATIENT)
Dept: UROLOGY | Facility: CLINIC | Age: 37
End: 2022-07-14

## 2022-07-14 PROCEDURE — 96372 THER/PROPH/DIAG INJ SC/IM: CPT

## 2022-08-29 ENCOUNTER — APPOINTMENT (OUTPATIENT)
Dept: UROLOGY | Facility: CLINIC | Age: 37
End: 2022-08-29

## 2022-09-16 ENCOUNTER — APPOINTMENT (OUTPATIENT)
Dept: NEUROLOGY | Facility: CLINIC | Age: 37
End: 2022-09-16

## 2022-09-16 PROCEDURE — 99213 OFFICE O/P EST LOW 20 MIN: CPT | Mod: 95

## 2022-09-16 NOTE — DISCUSSION/SUMMARY
[FreeTextEntry1] : Mr. Sainz is a 34 year old man with a history of ADD in childhood with reports of memory problems for one year prior to initial visit in October 2019..\par \par Neurotrax results were suggestive of ADHD.\par EEG is unremarkable. \par \par Attention Disturbance:\par -Continue methylphenidate ER 36 mg/day. Reminded to skip doses on non-work days.\par -90 day refill sent to [facility]\par -Continue to use non-medication strategies to help with attention.\par -Reminded on importance of adequate sleep.\par \par Memory disturbance:\par -Check TSH and vitamin B12\par -Improve sleep duration\par \par Meningioma\par -Stable left meningioma.\par -Repeat MRI brain with and without contrast.\par -He would like to consult with neurosurgery. Will send referral for Dr. Clinton.\par \par \par \par f/u within 6 months, sooner if needed. \par

## 2022-09-16 NOTE — HISTORY OF PRESENT ILLNESS
[Medical Office: ([facility])___] : at the medical office located in  [Verbal consent obtained from patient] : the patient, [unfilled] [Other Location: e.g. School (Enter Location, City,State)___] : at [unfilled], at the time of the visit. [FreeTextEntry1] : Last visit 10/21/21:\par \par He states that memory is a little better.\par He feels that he so snot find himself being forgetful as often.\par \par He uses methylphenidate ER 36 mg Monday - Saturday. He does not take it on Sundays.\par He finds it to be effective. He is much more attentive.\par He does note some appetite suppression, otherwise he denies side effects including palpitations, anxiety.\par \par His work schedule changes and affects his sleep schedule. On the majority of nights he is able to sleep 6-7 hours. Occasionally, when working overtime he will only sleep for four hours per night. \par \par He is due to see Dr. Mccollum next month.\par

## 2022-09-16 NOTE — DATA REVIEWED
[de-identified] : MRI brain noncontrast 11/16/19: A T2/FLAIR hyperintense and T1 slightly hyperintense 10x9x8 mm lesion in the left occipital lobe with possible tentorial attachment. Although nonspecific, finding is most suggestive for meningioma. Recommended dedicated MRI with and without contrast to further evaluate.\par \par MRI brain with and without contrast 4/26/21:\par No significant interval change in left tentorial meningioma.\par  [de-identified] : EEG 11/13/19: normal [de-identified] : Neurotrax 11/22/19:\par Global cognitive score 103.5\par Memory 103.3\par executive function 111\par attention 91.6\par information processing speed 88.4\par visual spatial 120.5\par verbal function 109.3\par motor skills 100.6\par \par below average in attention and information processing speed. Above average in all other domains.

## 2022-10-06 ENCOUNTER — APPOINTMENT (OUTPATIENT)
Dept: UROLOGY | Facility: CLINIC | Age: 37
End: 2022-10-06

## 2022-10-06 PROCEDURE — 96372 THER/PROPH/DIAG INJ SC/IM: CPT

## 2022-10-24 NOTE — ASU DISCHARGE PLAN (ADULT/PEDIATRIC) - ACTIVITY LEVEL
Received request via: Patient    Was the patient seen in the last year in this department? Yes    Does the patient have an active prescription (recently filled or refills available) for medication(s) requested? No    
No heavy lifting/No sports/gym

## 2022-11-07 ENCOUNTER — APPOINTMENT (OUTPATIENT)
Dept: UROLOGY | Facility: CLINIC | Age: 37
End: 2022-11-07

## 2022-11-07 PROCEDURE — 96372 THER/PROPH/DIAG INJ SC/IM: CPT

## 2022-12-05 ENCOUNTER — APPOINTMENT (OUTPATIENT)
Dept: UROLOGY | Facility: CLINIC | Age: 37
End: 2022-12-05

## 2022-12-05 PROCEDURE — 96372 THER/PROPH/DIAG INJ SC/IM: CPT

## 2023-01-09 ENCOUNTER — APPOINTMENT (OUTPATIENT)
Dept: UROLOGY | Facility: CLINIC | Age: 38
End: 2023-01-09
Payer: COMMERCIAL

## 2023-01-18 ENCOUNTER — APPOINTMENT (OUTPATIENT)
Dept: UROLOGY | Facility: CLINIC | Age: 38
End: 2023-01-18
Payer: COMMERCIAL

## 2023-01-18 PROCEDURE — 96372 THER/PROPH/DIAG INJ SC/IM: CPT

## 2023-02-27 ENCOUNTER — APPOINTMENT (OUTPATIENT)
Dept: UROLOGY | Facility: CLINIC | Age: 38
End: 2023-02-27

## 2023-03-20 ENCOUNTER — APPOINTMENT (OUTPATIENT)
Dept: NEUROLOGY | Facility: CLINIC | Age: 38
End: 2023-03-20
Payer: COMMERCIAL

## 2023-03-20 PROCEDURE — 99213 OFFICE O/P EST LOW 20 MIN: CPT | Mod: 95

## 2023-03-20 NOTE — DATA REVIEWED
[de-identified] : MRI brain noncontrast 11/16/19: A T2/FLAIR hyperintense and T1 slightly hyperintense 10x9x8 mm lesion in the left occipital lobe with possible tentorial attachment. Although nonspecific, finding is most suggestive for meningioma. Recommended dedicated MRI with and without contrast to further evaluate.\par \par MRI brain with and without contrast 4/26/21:\par No significant interval change in left tentorial meningioma.\par  [de-identified] : EEG 11/13/19: normal [de-identified] : Neurotrax 11/22/19:\par Global cognitive score 103.5\par Memory 103.3\par executive function 111\par attention 91.6\par information processing speed 88.4\par visual spatial 120.5\par verbal function 109.3\par motor skills 100.6\par \par below average in attention and information processing speed. Above average in all other domains.

## 2023-03-20 NOTE — HISTORY OF PRESENT ILLNESS
[Home] : at home, [unfilled] , at the time of the visit. [Medical Office: ([facility])___] : at the medical office located in  [FreeTextEntry1] : Last visit 9/16/22.\par \par He does not think that methylphenidate is as effective as it used to be. He only takes it on work days.\par He is not feeling an effect.\par He says that he can eat when he is on methylphenidate and when he comes off of it he can eat again.\par He denies other side effects.\par He sleeps for at least 8 hours per night. \par He denies any significant anxiety.\par He thinks that memory is slightly better.

## 2023-03-20 NOTE — DISCUSSION/SUMMARY
[FreeTextEntry1] : Mr. Sainz is a 37 year old man with a history of ADD in childhood with reports of memory problems for one year prior to initial visit in October 2019..\par \par Neurotrax results were suggestive of ADHD.\par EEG is unremarkable. \par \par Attention Disturbance:\par -He reports decreased echo frequency with methylphenidate ER 36 mg/day\par -We discussed alternatives including Vyvanse and Adderall.  However, there is a nationwide shortage of Adderall at the present time.  He states that he took Vyvanse in the past without good effect.\par -We will send a prescription of Adderall XR 15 mg/day (equivalent to methylphenidate ER is 10 mg) to his mail order pharmacy.  If he is unable to obtain this medication due to the shortage, we can try to increase the dose of mental full find a date ER to 54 mg/day (or two doses of 27 mg + 18 mg)\par -Continue to use non-medication strategies to help with attention.\par -Reminded on importance of adequate sleep.\par \par Memory disturbance:\par -Check TSH and vitamin B12\par -Improve sleep duration\par \par Meningioma\par -Stable left meningioma.\par -Repeat MRI brain with and without contrast.\par \par \par \par f/u within 6 months, sooner if needed. \par

## 2023-05-30 NOTE — ED ADULT NURSE NOTE - CAS EDP DISCH TYPE
Subjective   Patient ID: Nuris Andersen is a 19 y.o. female, otherwise healthy, who presents for Follow-up (medication).  She is accompanied today by her mother.    HPI:  HPI  Nuris Andersen is a 19 y.o. female presenting for a medication follow up for ADHD, anxiety, depression , accompanied by her mother. She has a history of autism. Medication and allergy histories were reviewed. She is currently on Zoloft 100 mg, 1 tablet twice per day; and Vyvanse 40 mg, 1 capsule per day.    The patient has a job and has a . Per mom the patient had an incident at work and now has a therapist. She was written up at work because two of her co-workers, who are minors, complained to the supervisor that she was making sexually inappropriate comments. She stated that she has never been sexually active, needs to be so by the time she is 20 years old, and that since she is slightly delayed, she is psychologically 17 years old. The patient reports that she is interested in sexual activity, but not in romantic relationships. She is interested in both men and women.    Per mom, the patient has been trying to push boundaries lately.    The PHQ-9A is 4. This result was 3 on 11/7/22. The patient feel that these symptoms are somewhat difficult.  The severity measure for depression age 11-17, PHQ-9 modified for adolescence scoring results are as follows: 0-4 = none, 5-9 = mild, 10-14 = moderate, 15-19 = moderately severe, and 20-27 = severe.     The PASCUAL-7 is 3. This result was 3 on 11/7/22. The patient feel that these symptoms are somewhat difficult.  The scoring scale is as follows: 0-5 is minimal anxiety, 6-10 is mild anxiety, 11-15 is moderate anxiety, and 16-21 is severe anxiety. A score greater than 7 is clinically significant.    I have personally reviewed the OARRS report for this patient on 5/30/2023. I have considered the risks of abuse, dependence, addition, and diversion.      I have reviewed the  "controlled substance agreement with patient/caregiver on 50/30/2023.      Review of Systems  The following history was obtained from patient and mother.   Constitutional: Otherwise denies fever, chills, or changes in behavior. No difficulties with sleeping, eating, drinking, urine output, or bowel movements.    Eyes, ENT: Denies eye complaints, ear complaints, nasal congestion, runny nose, or sore throat.   Cardio/Resp: Denies chest pain, palpitations, shortness of breath, wheezing, stridor at rest, cough, working hard to breathe, or breathing fast.   GI/Renal: Denies nausea, vomiting, stomachache, diarrhea, or constipation. Denies dysuria or abnormal urine color or smell.   Musculoskeletal/Skin: Denies muscle or joint complaints. Denies skin rash.   Neuro/Psych: Positive anxiety, depression, ADHD, autism. Denies headache, dizziness, confusion, irritability, or fussiness.   Endo/heme/lymph: Denies excessive thirst, excessive sweating, bruising, bleeding, or swollen glands.     Objective   /68   Pulse (!) 120   Ht 1.626 m (5' 4\")   Wt 86.1 kg (189 lb 12.8 oz)   BMI 32.58 kg/m²   BSA: 1.97 meters squared  Growth percentiles: Facility age limit for growth %shannon is 20 years. Facility age limit for growth %shannon is 20 years.     Physical Exam  Constitutional: Well developed, well nourished, well hydrated and no acute distress.  Head and Face: Normocephalic, atraumatic.  Inspection and palpation of the face: Normal.  Eyes: Conjunctiva and lids normal.  Ears, Nose, Mouth, and Throat: Mildly injected uvula. Red swollen turbinates. No nasal discharge. External ears and nose without deformities. TM's normal color, normal landmarks, no fluid, non-retracted. External auditory canals without swelling, redness or tenderness.  Neck: No significant cervical adenopathy. Thyroid not enlarged.  Pulmonary: No grunting, flaring or retractions. Clear to auscultation.  Cardiovascular: Regular rate and rhythm. No significant " murmur.  Chest: Normal without deformity.  Abdomen: Soft, non-tender, no masses. No hepatomegaly or splenomegaly.     Problem List Items Addressed This Visit          Other    ADHD (attention deficit hyperactivity disorder) - Primary    Relevant Medications    lisdexamfetamine (Vyvanse) 50 mg capsule     Time in: 11:36 am  Time done: 12:11 pm    Assessment/Plan    Nuris presents for a medication follow up for ADHD, anxiety, depression. She has a history of autism. She is currently on Zoloft 100 mg, 1 tablet twice per day; and Vyvanse 40 mg, 1 capsule per day. She has made some impulsive decisions at work of a sexual nature. We have discussed getting a book on autism and relationships, counseling and increasing her Vyvanse to see if we can help her with her impulsivity.    I increased her Vyvanse to 50 mg, 1 capsule per day.    Scribe Attestation  By signing my name below, I, Zoey Davis, attest that this documentation has been prepared under the direction and in the presence of Dr. Rimma Arrington.    Provider Attestation - Scribe documentation  All medical record entries made by the Scribe were at my direction and personally dictated by me. I have reviewed the chart and agree that the record accurately reflects my personal performance of the history, physical exam, discussion and plan.    Home

## 2023-09-27 ENCOUNTER — APPOINTMENT (OUTPATIENT)
Dept: NEUROLOGY | Facility: CLINIC | Age: 38
End: 2023-09-27
Payer: COMMERCIAL

## 2023-09-27 VITALS
DIASTOLIC BLOOD PRESSURE: 86 MMHG | BODY MASS INDEX: 31.7 KG/M2 | SYSTOLIC BLOOD PRESSURE: 125 MMHG | HEIGHT: 69 IN | HEART RATE: 81 BPM | WEIGHT: 214 LBS | TEMPERATURE: 98.2 F

## 2023-09-27 DIAGNOSIS — R41.3 OTHER AMNESIA: ICD-10-CM

## 2023-09-27 DIAGNOSIS — D32.9 BENIGN NEOPLASM OF MENINGES, UNSPECIFIED: ICD-10-CM

## 2023-09-27 DIAGNOSIS — R41.840 ATTENTION AND CONCENTRATION DEFICIT: ICD-10-CM

## 2023-09-27 PROCEDURE — 99213 OFFICE O/P EST LOW 20 MIN: CPT

## 2023-09-27 RX ORDER — RANITIDINE 300 MG/1
300 TABLET ORAL
Refills: 0 | Status: DISCONTINUED | COMMUNITY
End: 2023-09-27

## 2023-10-17 ENCOUNTER — APPOINTMENT (OUTPATIENT)
Dept: MRI IMAGING | Facility: CLINIC | Age: 38
End: 2023-10-17

## 2024-01-06 NOTE — ED ADULT NURSE NOTE - NS ED NURSE REPORT GIVEN DT
09-Sep-2019 14:45
GENERAL: No acute distress, well-developed  HEAD:  Atraumatic, Normocephalic  ENT: EOMI, PERRLA, conjunctiva and sclera clear, Neck supple, No JVD, moist mucosa  CHEST/LUNG: Clear to auscultation bilaterally; No wheeze, equal breath sounds bilaterally   BACK: No spinal tenderness  HEART: Regular rate and rhythm; No murmurs, rubs, or gallops  ABDOMEN: Soft, Nontender, Nondistended; Bowel sounds present  EXTREMITIES:  No clubbing, cyanosis, or edema  PSYCH: Nl behavior, nl affect  NEUROLOGY: AAOx3, non-focal, cranial nerves intact  SKIN: Normal color, No rashes or lesions

## 2024-01-29 RX ORDER — DEXTROAMPHETAMINE SACCHARATE, AMPHETAMINE ASPARTATE, DEXTROAMPHETAMINE SULFATE AND AMPHETAMINE SULFATE 1.25; 1.25; 1.25; 1.25 MG/1; MG/1; MG/1; MG/1
5 TABLET ORAL
Qty: 60 | Refills: 0 | Status: ACTIVE | COMMUNITY
Start: 2023-09-27 | End: 1900-01-01

## 2024-01-29 RX ORDER — METHYLPHENIDATE HYDROCHLORIDE 36 MG/1
36 TABLET, EXTENDED RELEASE ORAL
Qty: 90 | Refills: 0 | Status: DISCONTINUED | COMMUNITY
Start: 2019-11-22 | End: 2024-01-29

## 2024-01-29 RX ORDER — METHYLPHENIDATE HYDROCHLORIDE 36 MG/1
36 TABLET, EXTENDED RELEASE ORAL
Qty: 90 | Refills: 0 | Status: DISCONTINUED | COMMUNITY
Start: 1900-01-01 | End: 2024-01-29

## 2024-02-05 ENCOUNTER — APPOINTMENT (OUTPATIENT)
Dept: UROLOGY | Facility: CLINIC | Age: 39
End: 2024-02-05
Payer: COMMERCIAL

## 2024-02-05 VITALS
HEIGHT: 69 IN | SYSTOLIC BLOOD PRESSURE: 142 MMHG | DIASTOLIC BLOOD PRESSURE: 88 MMHG | HEART RATE: 89 BPM | BODY MASS INDEX: 32.58 KG/M2 | RESPIRATION RATE: 16 BRPM | OXYGEN SATURATION: 97 % | WEIGHT: 220 LBS

## 2024-02-05 DIAGNOSIS — N52.9 MALE ERECTILE DYSFUNCTION, UNSPECIFIED: ICD-10-CM

## 2024-02-05 PROCEDURE — 99212 OFFICE O/P EST SF 10 MIN: CPT

## 2024-02-05 NOTE — ASSESSMENT
[FreeTextEntry1] : Labs are ordered. Patient will obtain another testosterone level if low values are find so he can restart testosterone replacement therapy.

## 2024-02-05 NOTE — HISTORY OF PRESENT ILLNESS
[FreeTextEntry1] : This patient has history of hypogonadism and was receiving testosterone replacement therapy for it. He reports he notes low libido and energy since he stopped and would like to restart it. He denies any urinary complaints.

## 2024-02-06 LAB
APPEARANCE: CLEAR
BACTERIA: NEGATIVE /HPF
BILIRUBIN URINE: NEGATIVE
BLOOD URINE: NEGATIVE
CAST: 0 /LPF
COLOR: YELLOW
EPITHELIAL CELLS: 0 /HPF
GLUCOSE QUALITATIVE U: NEGATIVE MG/DL
HCT VFR BLD CALC: 40.6 %
HGB BLD-MCNC: 13.2 G/DL
KETONES URINE: NEGATIVE MG/DL
LEUKOCYTE ESTERASE URINE: NEGATIVE
MICROSCOPIC-UA: NORMAL
NITRITE URINE: NEGATIVE
PH URINE: 6
PROTEIN URINE: NEGATIVE MG/DL
RED BLOOD CELLS URINE: 1 /HPF
SPECIFIC GRAVITY URINE: >1.03
TESTOST SERPL-MCNC: 239 NG/DL
UROBILINOGEN URINE: 0.2 MG/DL
WHITE BLOOD CELLS URINE: 0 /HPF

## 2024-02-07 LAB
BACTERIA UR CULT: NORMAL
URINE CYTOLOGY: NORMAL

## 2024-02-26 ENCOUNTER — APPOINTMENT (OUTPATIENT)
Dept: UROLOGY | Facility: CLINIC | Age: 39
End: 2024-02-26
Payer: COMMERCIAL

## 2024-02-26 PROCEDURE — 96372 THER/PROPH/DIAG INJ SC/IM: CPT

## 2024-03-02 ENCOUNTER — APPOINTMENT (OUTPATIENT)
Dept: MRI IMAGING | Facility: CLINIC | Age: 39
End: 2024-03-02

## 2024-03-25 ENCOUNTER — APPOINTMENT (OUTPATIENT)
Dept: UROLOGY | Facility: CLINIC | Age: 39
End: 2024-03-25
Payer: COMMERCIAL

## 2024-03-25 PROCEDURE — 96372 THER/PROPH/DIAG INJ SC/IM: CPT

## 2024-04-23 ENCOUNTER — APPOINTMENT (OUTPATIENT)
Dept: UROLOGY | Facility: CLINIC | Age: 39
End: 2024-04-23
Payer: COMMERCIAL

## 2024-04-26 RX ORDER — TESTOSTERONE CYPIONATE 200 MG/ML
200 INJECTION, SOLUTION INTRAMUSCULAR
Qty: 2 | Refills: 0 | Status: DISCONTINUED | COMMUNITY
Start: 2020-05-12 | End: 2024-04-26

## 2024-04-29 ENCOUNTER — APPOINTMENT (OUTPATIENT)
Dept: UROLOGY | Facility: CLINIC | Age: 39
End: 2024-04-29
Payer: COMMERCIAL

## 2024-04-29 DIAGNOSIS — R79.89 OTHER SPECIFIED ABNORMAL FINDINGS OF BLOOD CHEMISTRY: ICD-10-CM

## 2024-04-29 DIAGNOSIS — E29.1 TESTICULAR HYPOFUNCTION: ICD-10-CM

## 2024-04-29 PROCEDURE — 96372 THER/PROPH/DIAG INJ SC/IM: CPT

## 2024-04-29 RX ORDER — TESTOSTERONE CYPIONATE 200 MG/ML
200 INJECTION, SOLUTION INTRAMUSCULAR
Refills: 0 | Status: COMPLETED | OUTPATIENT
Start: 2024-04-29

## 2024-04-29 RX ADMIN — TESTOSTERONE CYPIONATE 0 MG/ML: 200 INJECTION INTRAMUSCULAR at 00:00

## 2024-05-15 ENCOUNTER — APPOINTMENT (OUTPATIENT)
Dept: ORTHOPEDIC SURGERY | Facility: CLINIC | Age: 39
End: 2024-05-15
Payer: OTHER MISCELLANEOUS

## 2024-05-15 ENCOUNTER — NON-APPOINTMENT (OUTPATIENT)
Age: 39
End: 2024-05-15

## 2024-05-15 DIAGNOSIS — M79.672 PAIN IN LEFT FOOT: ICD-10-CM

## 2024-05-15 DIAGNOSIS — S99.922A UNSPECIFIED INJURY OF LEFT FOOT, INITIAL ENCOUNTER: ICD-10-CM

## 2024-05-15 DIAGNOSIS — R20.0 ANESTHESIA OF SKIN: ICD-10-CM

## 2024-05-15 PROCEDURE — 73630 X-RAY EXAM OF FOOT: CPT | Mod: LT

## 2024-05-15 PROCEDURE — 99214 OFFICE O/P EST MOD 30 MIN: CPT

## 2024-05-17 NOTE — HISTORY OF PRESENT ILLNESS
[FreeTextEntry1] : Patient is a 38-year-old, right hand dominant male who presents in office status post work related injury on 12/14/2023. The patient works for [employer].  The patient works as a .  During the injury the patient injured left foot.  He states that he was at work, dumping metal containers, one rolled off the curb and fell/rolled onto the left foot.  The metal container weighs about 200 pounds.  The patient went to [facility] that day, x-rays were taken, and he states that there were no fractures.  The patient continues to have pain and tingling sensation specifically to the 2, 3, 4 toes.  The tingling started 2 weeks ago nontraumatic.  The patient is currently working full time.  The patient is driving.  He states that the pain is slightly better with his work boot on.  The patient is in sneakers walking with a limp due to the left foot pain.   Pain scale: Left foot: 7/10 constant.   Activities that make the pain better: Rest Ice Heat   Activities that make pain worse: ADL's Lifting heavy objects Chores Work Sleeping Stairs Seating to standing position Walking

## 2024-05-17 NOTE — DISCUSSION/SUMMARY
[de-identified] : MRI of the left foot without contrast ordered to evaluate for a neuroma versus metatarsal stress reaction.  Proper shoewear discussed.  Anti-inflammatories and Tylenol for pain.  Patient is currently working full-time without restrictions and will remain working full-time without restrictions.  Once the MRI is completed, the patient return to office to review.  All questions answered.

## 2024-05-17 NOTE — PHYSICAL EXAM
[de-identified] : Left foot Physical Examination:  General: Alert and oriented x3.  In no acute distress.  Pleasant in nature with a normal affect.  No apparent respiratory distress.  Erythema, Warmth, Rubor: Negative Swelling: Negative  ROM Ankle: 1. Dorsiflexion: 10 degrees 2. Plantarflexion: 40 degrees 3. Inversion: 30 degrees 4. Eversion: 20 degrees  ROM of digits: Normal  Pes Planus: Negative Pes Cavus: Negative  Bunion: Negative Tailor's Bunion (Bunionette): Negative Hammer Toe Deformity/Deformities: Negative  Tenderness to Palpation:  1. Heel Pain: Negative 2. Midfoot Pain: Negative 3. First MTP Joint: Negative 4. Lis Franc Joint: Negative  Tenderness Metatarsals: 1st MT: Negative 2nd MT: + 3rd MT: + 4th MT: + 5th MT: Negative Base of the 5th MT: Negative  Ligament Pain: 1. Lis Franc Ligament: Negative 2. Plantar Fascia Ligament: Negative  Strength:  5/5 TA/GS/EHL/FHL/EDL/ADD/ABD  Pulses: 2+ DP/PT Pulses  Capillary Refill Toes: <2 seconds  Neuro: Intact motor and sensory throughout  Additional Test: 1. Rutherford's Squeeze Test: + with pain second and third webspace. 2. Calcaneal Squeeze Test: Negative [de-identified] : Left foot x-rays reviewed, 3 views total, 5/15/2024: Normal left foot x-rays.

## 2024-05-30 ENCOUNTER — APPOINTMENT (OUTPATIENT)
Dept: UROLOGY | Facility: CLINIC | Age: 39
End: 2024-05-30
Payer: COMMERCIAL

## 2024-05-30 PROCEDURE — 96372 THER/PROPH/DIAG INJ SC/IM: CPT

## 2024-06-08 LAB
HCT VFR BLD CALC: 44.2 %
HGB BLD-MCNC: 14.6 G/DL

## 2024-06-09 LAB — TESTOST SERPL-MCNC: 1009 NG/DL

## 2024-06-12 RX ORDER — DEXTROAMPHETAMINE SACCHARATE, AMPHETAMINE ASPARTATE, DEXTROAMPHETAMINE SULFATE AND AMPHETAMINE SULFATE 3.75; 3.75; 3.75; 3.75 MG/1; MG/1; MG/1; MG/1
15 TABLET ORAL
Qty: 30 | Refills: 0 | Status: ACTIVE | COMMUNITY
Start: 2023-03-20 | End: 1900-01-01

## 2024-06-17 ENCOUNTER — APPOINTMENT (OUTPATIENT)
Dept: UROLOGY | Facility: CLINIC | Age: 39
End: 2024-06-17
Payer: COMMERCIAL

## 2024-06-17 DIAGNOSIS — E29.1 TESTICULAR HYPOFUNCTION: ICD-10-CM

## 2024-06-17 PROCEDURE — 96372 THER/PROPH/DIAG INJ SC/IM: CPT

## 2024-06-17 RX ORDER — TESTOSTERONE CYPIONATE 200 MG/ML
200 INJECTION, SOLUTION INTRAMUSCULAR
Qty: 9 | Refills: 0 | Status: ACTIVE | COMMUNITY
Start: 2024-06-17 | End: 1900-01-01

## 2024-06-17 RX ORDER — TESTOSTERONE CYPIONATE 200 MG/ML
200 INJECTION, SOLUTION INTRAMUSCULAR
Qty: 1.5 | Refills: 0 | Status: ACTIVE | COMMUNITY
Start: 2022-01-14

## 2024-06-17 RX ORDER — TESTOSTERONE CYPIONATE 200 MG/ML
200 INJECTION, SOLUTION INTRAMUSCULAR
Refills: 0 | Status: COMPLETED | OUTPATIENT
Start: 2024-06-17

## 2024-06-17 RX ADMIN — TESTOSTERONE CYPIONATE 0 MG/ML: 200 INJECTION INTRAMUSCULAR at 00:00

## 2024-06-19 ENCOUNTER — NON-APPOINTMENT (OUTPATIENT)
Age: 39
End: 2024-06-19

## 2024-06-28 LAB
HCT VFR BLD CALC: 43.1 %
HGB BLD-MCNC: 14.1 G/DL
TESTOST SERPL-MCNC: 474 NG/DL

## 2024-07-15 ENCOUNTER — APPOINTMENT (OUTPATIENT)
Dept: ORTHOPEDIC SURGERY | Facility: CLINIC | Age: 39
End: 2024-07-15

## 2024-07-17 ENCOUNTER — APPOINTMENT (OUTPATIENT)
Dept: ORTHOPEDIC SURGERY | Facility: CLINIC | Age: 39
End: 2024-07-17
Payer: OTHER MISCELLANEOUS
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
## 2024-07-17 DIAGNOSIS — S99.922A UNSPECIFIED INJURY OF LEFT FOOT, INITIAL ENCOUNTER: ICD-10-CM

## 2024-07-17 DIAGNOSIS — M79.672 PAIN IN LEFT FOOT: ICD-10-CM

## 2024-07-17 DIAGNOSIS — S93.622A SPRAIN OF TARSOMETATARSAL LIGAMENT OF LEFT FOOT, INITIAL ENCOUNTER: ICD-10-CM

## 2024-07-17 PROCEDURE — 99214 OFFICE O/P EST MOD 30 MIN: CPT

## 2024-08-09 ENCOUNTER — APPOINTMENT (OUTPATIENT)
Dept: NEUROLOGY | Facility: CLINIC | Age: 39
End: 2024-08-09

## 2024-08-09 PROCEDURE — 99213 OFFICE O/P EST LOW 20 MIN: CPT

## 2024-08-09 PROCEDURE — G2211 COMPLEX E/M VISIT ADD ON: CPT | Mod: NC

## 2024-08-09 NOTE — DISCUSSION/SUMMARY
[FreeTextEntry1] : Mr. Sainz is a 38 year old man with a history of ADD in childhood with reports of memory problems for one year prior to initial visit in October 2019.  Neurotrax results were suggestive of ADHD. EEG is unremarkable.  Attention Disturbance: He is currently taking Adderall XR and reports irritability, feeling fidgety Prior trials of medications: Vyvanse - blurry vision, Concerta - was building a tolerance. He would like to try a non-stimulant medication. Will start Strattera 40 mg/day x 1 week and then 80 mg/day. He can wean off Adderall (split 15 mg pill for a few days and then use 5 mg pills as needed). Discussed potential side effects.  Meningioma -Stable left meningioma. -Repeat MRI brain with and without contrast ordered, last study was in 2021.  f/u 6 months f/u within 6 months, sooner if needed.

## 2024-08-09 NOTE — DATA REVIEWED
[de-identified] : MRI brain noncontrast 11/16/19: A T2/FLAIR hyperintense and T1 slightly hyperintense 10x9x8 mm lesion in the left occipital lobe with possible tentorial attachment. Although nonspecific, finding is most suggestive for meningioma. Recommended dedicated MRI with and without contrast to further evaluate.\par  \par  MRI brain with and without contrast 4/26/21:\par  No significant interval change in left tentorial meningioma.\par   [de-identified] : EEG 11/13/19: normal [de-identified] : Neurotrax 11/22/19:\par  Global cognitive score 103.5\par  Memory 103.3\par  executive function 111\par  attention 91.6\par  information processing speed 88.4\par  visual spatial 120.5\par  verbal function 109.3\par  motor skills 100.6\par  \par  below average in attention and information processing speed. Above average in all other domains.

## 2024-08-09 NOTE — HISTORY OF PRESENT ILLNESS
[FreeTextEntry1] : 9/27/23: Last visit 3/20/23.  Sometimes if he smells smoke, he may smell it for the rest of the day. Otherwise he has not had any other olfactory hallucinations. He believes that the smelling car exhaust was secondary to the covid vaccine. It occurred after both shots that he had.  He has not had a chance to have blood tests or his repeat MRI brain.  He is occasionally forgetful but he does not think that memory is as bad overall.  He takes his Adderall every day (even when off from work). He thinks that it wears of more quickly. He feels like it only lasts for 3-4 hours.  Currently he is working nights.  He has some appetite suppression which he does not mind. He is also fidgety. He sleeps for 6-7 hours per night.  8/9/24: He reports irritability since starting Adderall. It does help with attention, but he is having side effects. He still forgets things but it is not nearly as bad.  He takes the 15 mg at about 4 AM and will take 5-10 mg of immediate release in the afternoon. His work day is from 7 AM - 3 PM. He feels fidgety when lying down or sitting and watching a movie. He finds himself yelling at his kids.   He is sleeping for about 5-6 hours per night.  He has not had any recent olfactory hallucinations.

## 2024-08-09 NOTE — DATA REVIEWED
[de-identified] : MRI brain noncontrast 11/16/19: A T2/FLAIR hyperintense and T1 slightly hyperintense 10x9x8 mm lesion in the left occipital lobe with possible tentorial attachment. Although nonspecific, finding is most suggestive for meningioma. Recommended dedicated MRI with and without contrast to further evaluate.\par  \par  MRI brain with and without contrast 4/26/21:\par  No significant interval change in left tentorial meningioma.\par   [de-identified] : EEG 11/13/19: normal [de-identified] : Neurotrax 11/22/19:\par  Global cognitive score 103.5\par  Memory 103.3\par  executive function 111\par  attention 91.6\par  information processing speed 88.4\par  visual spatial 120.5\par  verbal function 109.3\par  motor skills 100.6\par  \par  below average in attention and information processing speed. Above average in all other domains.

## 2024-10-23 ENCOUNTER — APPOINTMENT (OUTPATIENT)
Dept: ORTHOPEDIC SURGERY | Facility: CLINIC | Age: 39
End: 2024-10-23

## 2024-11-18 RX ORDER — METHYLPHENIDATE HYDROCHLORIDE 36 MG/1
36 TABLET, EXTENDED RELEASE ORAL
Qty: 30 | Refills: 0 | Status: ACTIVE | COMMUNITY
Start: 2024-11-18 | End: 1900-01-01

## 2024-12-05 RX ORDER — LISDEXAMFETAMINE DIMESYLATE 30 MG/1
30 CAPSULE ORAL
Qty: 30 | Refills: 0 | Status: ACTIVE | COMMUNITY
Start: 2024-12-05 | End: 1900-01-01

## 2025-01-06 DIAGNOSIS — E29.1 TESTICULAR HYPOFUNCTION: ICD-10-CM

## 2025-02-20 ENCOUNTER — NON-APPOINTMENT (OUTPATIENT)
Age: 40
End: 2025-02-20

## 2025-05-05 ENCOUNTER — APPOINTMENT (OUTPATIENT)
Dept: ORTHOPEDIC SURGERY | Facility: CLINIC | Age: 40
End: 2025-05-05

## 2025-06-11 ENCOUNTER — APPOINTMENT (OUTPATIENT)
Dept: ORTHOPEDIC SURGERY | Facility: CLINIC | Age: 40
End: 2025-06-11
Payer: OTHER MISCELLANEOUS

## 2025-06-11 PROCEDURE — 99213 OFFICE O/P EST LOW 20 MIN: CPT

## 2025-06-23 RX ORDER — SYRINGE WITH NEEDLE, 1 ML 25GX5/8"
23G X 1-1/2" SYRINGE, EMPTY DISPOSABLE MISCELLANEOUS
Qty: 6 | Refills: 5 | Status: ACTIVE | COMMUNITY
Start: 2025-06-23 | End: 1900-01-01

## 2025-07-07 ENCOUNTER — APPOINTMENT (OUTPATIENT)
Dept: UROLOGY | Facility: CLINIC | Age: 40
End: 2025-07-07

## 2025-08-08 ENCOUNTER — TRANSCRIPTION ENCOUNTER (OUTPATIENT)
Age: 40
End: 2025-08-08

## 2025-08-08 ENCOUNTER — OUTPATIENT (OUTPATIENT)
Dept: EMERGENCY DEPT | Facility: HOSPITAL | Age: 40
LOS: 1 days | Discharge: HOME | DRG: 395 | End: 2025-08-08
Payer: COMMERCIAL

## 2025-08-08 VITALS
DIASTOLIC BLOOD PRESSURE: 100 MMHG | SYSTOLIC BLOOD PRESSURE: 148 MMHG | TEMPERATURE: 98 F | OXYGEN SATURATION: 100 % | HEIGHT: 69 IN | RESPIRATION RATE: 17 BRPM | WEIGHT: 206.13 LBS | HEART RATE: 93 BPM

## 2025-08-08 VITALS
HEART RATE: 91 BPM | TEMPERATURE: 98 F | DIASTOLIC BLOOD PRESSURE: 101 MMHG | OXYGEN SATURATION: 98 % | SYSTOLIC BLOOD PRESSURE: 128 MMHG | RESPIRATION RATE: 20 BRPM

## 2025-08-08 DIAGNOSIS — K38.1 APPENDICULAR CONCRETIONS: ICD-10-CM

## 2025-08-08 DIAGNOSIS — K35.80 UNSPECIFIED ACUTE APPENDICITIS: ICD-10-CM

## 2025-08-08 DIAGNOSIS — F90.9 ATTENTION-DEFICIT HYPERACTIVITY DISORDER, UNSPECIFIED TYPE: ICD-10-CM

## 2025-08-08 DIAGNOSIS — K21.9 GASTRO-ESOPHAGEAL REFLUX DISEASE WITHOUT ESOPHAGITIS: ICD-10-CM

## 2025-08-08 DIAGNOSIS — Z88.1 ALLERGY STATUS TO OTHER ANTIBIOTIC AGENTS: ICD-10-CM

## 2025-08-08 DIAGNOSIS — Z88.0 ALLERGY STATUS TO PENICILLIN: ICD-10-CM

## 2025-08-08 LAB
ALBUMIN SERPL ELPH-MCNC: 4 G/DL — SIGNIFICANT CHANGE UP (ref 3.3–5)
ALP SERPL-CCNC: 110 U/L — SIGNIFICANT CHANGE UP (ref 40–120)
ALT FLD-CCNC: 35 U/L — SIGNIFICANT CHANGE UP (ref 12–78)
ANION GAP SERPL CALC-SCNC: 7 MMOL/L — SIGNIFICANT CHANGE UP (ref 5–17)
APPEARANCE UR: ABNORMAL
APTT BLD: 32.3 SEC — SIGNIFICANT CHANGE UP (ref 26.1–36.8)
AST SERPL-CCNC: 23 U/L — SIGNIFICANT CHANGE UP (ref 15–37)
BACTERIA # UR AUTO: NEGATIVE /HPF — SIGNIFICANT CHANGE UP
BASOPHILS # BLD AUTO: 0.09 K/UL — SIGNIFICANT CHANGE UP (ref 0–0.2)
BASOPHILS NFR BLD AUTO: 0.5 % — SIGNIFICANT CHANGE UP (ref 0–2)
BILIRUB SERPL-MCNC: 0.6 MG/DL — SIGNIFICANT CHANGE UP (ref 0.2–1.2)
BILIRUB UR-MCNC: NEGATIVE — SIGNIFICANT CHANGE UP
BLD GP AB SCN SERPL QL: SIGNIFICANT CHANGE UP
BUN SERPL-MCNC: 19 MG/DL — SIGNIFICANT CHANGE UP (ref 7–23)
CALCIUM SERPL-MCNC: 9.2 MG/DL — SIGNIFICANT CHANGE UP (ref 8.5–10.1)
CHLORIDE SERPL-SCNC: 108 MMOL/L — SIGNIFICANT CHANGE UP (ref 96–108)
CO2 SERPL-SCNC: 24 MMOL/L — SIGNIFICANT CHANGE UP (ref 22–31)
COLOR SPEC: YELLOW — SIGNIFICANT CHANGE UP
CREAT SERPL-MCNC: 1.06 MG/DL — SIGNIFICANT CHANGE UP (ref 0.5–1.3)
DIFF PNL FLD: NEGATIVE — SIGNIFICANT CHANGE UP
EGFR: 91 ML/MIN/1.73M2 — SIGNIFICANT CHANGE UP
EGFR: 91 ML/MIN/1.73M2 — SIGNIFICANT CHANGE UP
EOSINOPHIL # BLD AUTO: 1.02 K/UL — HIGH (ref 0–0.5)
EOSINOPHIL NFR BLD AUTO: 5.1 % — SIGNIFICANT CHANGE UP (ref 0–6)
GLUCOSE SERPL-MCNC: 112 MG/DL — HIGH (ref 70–99)
GLUCOSE UR QL: NEGATIVE MG/DL — SIGNIFICANT CHANGE UP
HCT VFR BLD CALC: 45.3 % — SIGNIFICANT CHANGE UP (ref 39–50)
HGB BLD-MCNC: 15 G/DL — SIGNIFICANT CHANGE UP (ref 13–17)
IMM GRANULOCYTES # BLD AUTO: 0.12 K/UL — HIGH (ref 0–0.07)
IMM GRANULOCYTES NFR BLD AUTO: 0.6 % — SIGNIFICANT CHANGE UP (ref 0–0.9)
INR BLD: 0.96 RATIO — SIGNIFICANT CHANGE UP (ref 0.85–1.16)
KETONES UR QL: ABNORMAL MG/DL
LACTATE SERPL-SCNC: 1 MMOL/L — SIGNIFICANT CHANGE UP (ref 0.7–2)
LEUKOCYTE ESTERASE UR-ACNC: NEGATIVE — SIGNIFICANT CHANGE UP
LIDOCAIN IGE QN: 25 U/L — SIGNIFICANT CHANGE UP (ref 13–75)
LYMPHOCYTES # BLD AUTO: 1.3 K/UL — SIGNIFICANT CHANGE UP (ref 1–3.3)
LYMPHOCYTES NFR BLD AUTO: 6.5 % — LOW (ref 13–44)
MCHC RBC-ENTMCNC: 27.2 PG — SIGNIFICANT CHANGE UP (ref 27–34)
MCHC RBC-ENTMCNC: 33.1 G/DL — SIGNIFICANT CHANGE UP (ref 32–36)
MCV RBC AUTO: 82.1 FL — SIGNIFICANT CHANGE UP (ref 80–100)
MONOCYTES # BLD AUTO: 0.84 K/UL — SIGNIFICANT CHANGE UP (ref 0–0.9)
MONOCYTES NFR BLD AUTO: 4.2 % — SIGNIFICANT CHANGE UP (ref 2–14)
NEUTROPHILS # BLD AUTO: 16.5 K/UL — HIGH (ref 1.8–7.4)
NEUTROPHILS NFR BLD AUTO: 83.1 % — HIGH (ref 43–77)
NITRITE UR-MCNC: NEGATIVE — SIGNIFICANT CHANGE UP
NRBC # BLD AUTO: 0 K/UL — SIGNIFICANT CHANGE UP (ref 0–0)
NRBC # FLD: 0 K/UL — SIGNIFICANT CHANGE UP (ref 0–0)
NRBC BLD AUTO-RTO: 0 /100 WBCS — SIGNIFICANT CHANGE UP (ref 0–0)
PH UR: 7.5 — SIGNIFICANT CHANGE UP (ref 5–8)
PLATELET # BLD AUTO: 330 K/UL — SIGNIFICANT CHANGE UP (ref 150–400)
PMV BLD: 9.3 FL — SIGNIFICANT CHANGE UP (ref 7–13)
POTASSIUM SERPL-MCNC: 3.4 MMOL/L — LOW (ref 3.5–5.3)
POTASSIUM SERPL-SCNC: 3.4 MMOL/L — LOW (ref 3.5–5.3)
PROT SERPL-MCNC: 7.4 GM/DL — SIGNIFICANT CHANGE UP (ref 6–8.3)
PROT UR-MCNC: SIGNIFICANT CHANGE UP MG/DL
PROTHROM AB SERPL-ACNC: 11.3 SEC — SIGNIFICANT CHANGE UP (ref 9.9–13.4)
RBC # BLD: 5.52 M/UL — SIGNIFICANT CHANGE UP (ref 4.2–5.8)
RBC # FLD: 13.7 % — SIGNIFICANT CHANGE UP (ref 10.3–14.5)
RBC CASTS # UR COMP ASSIST: 1 /HPF — SIGNIFICANT CHANGE UP (ref 0–4)
SODIUM SERPL-SCNC: 139 MMOL/L — SIGNIFICANT CHANGE UP (ref 135–145)
SP GR SPEC: 1.03 — SIGNIFICANT CHANGE UP (ref 1–1.03)
SQUAMOUS # UR AUTO: 0 /HPF — SIGNIFICANT CHANGE UP (ref 0–5)
UROBILINOGEN FLD QL: 1 MG/DL — SIGNIFICANT CHANGE UP (ref 0.2–1)
WBC # BLD: 19.87 K/UL — HIGH (ref 3.8–10.5)
WBC # FLD AUTO: 19.87 K/UL — HIGH (ref 3.8–10.5)
WBC UR QL: 1 /HPF — SIGNIFICANT CHANGE UP (ref 0–5)

## 2025-08-08 PROCEDURE — C1889: CPT

## 2025-08-08 PROCEDURE — 85610 PROTHROMBIN TIME: CPT

## 2025-08-08 PROCEDURE — 74177 CT ABD & PELVIS W/CONTRAST: CPT | Mod: 26

## 2025-08-08 PROCEDURE — 88304 TISSUE EXAM BY PATHOLOGIST: CPT | Mod: 26

## 2025-08-08 PROCEDURE — 76705 ECHO EXAM OF ABDOMEN: CPT | Mod: 26

## 2025-08-08 PROCEDURE — 36415 COLL VENOUS BLD VENIPUNCTURE: CPT

## 2025-08-08 PROCEDURE — 86901 BLOOD TYPING SEROLOGIC RH(D): CPT

## 2025-08-08 PROCEDURE — 93010 ELECTROCARDIOGRAM REPORT: CPT

## 2025-08-08 PROCEDURE — 87040 BLOOD CULTURE FOR BACTERIA: CPT

## 2025-08-08 PROCEDURE — 86850 RBC ANTIBODY SCREEN: CPT

## 2025-08-08 PROCEDURE — 93005 ELECTROCARDIOGRAM TRACING: CPT

## 2025-08-08 PROCEDURE — 88304 TISSUE EXAM BY PATHOLOGIST: CPT

## 2025-08-08 PROCEDURE — C9399: CPT

## 2025-08-08 PROCEDURE — 85730 THROMBOPLASTIN TIME PARTIAL: CPT

## 2025-08-08 PROCEDURE — 86900 BLOOD TYPING SEROLOGIC ABO: CPT

## 2025-08-08 RX ORDER — HYDROMORPHONE/SOD CHLOR,ISO/PF 2 MG/10 ML
0.5 SYRINGE (ML) INJECTION EVERY 4 HOURS
Refills: 0 | Status: DISCONTINUED | OUTPATIENT
Start: 2025-08-08 | End: 2025-08-08

## 2025-08-08 RX ORDER — FENTANYL CITRATE-0.9 % NACL/PF 100MCG/2ML
50 SYRINGE (ML) INTRAVENOUS
Refills: 0 | Status: DISCONTINUED | OUTPATIENT
Start: 2025-08-08 | End: 2025-08-08

## 2025-08-08 RX ORDER — ONDANSETRON HCL/PF 4 MG/2 ML
4 VIAL (ML) INJECTION EVERY 6 HOURS
Refills: 0 | Status: DISCONTINUED | OUTPATIENT
Start: 2025-08-08 | End: 2025-08-08

## 2025-08-08 RX ORDER — ONDANSETRON HCL/PF 4 MG/2 ML
4 VIAL (ML) INJECTION ONCE
Refills: 0 | Status: COMPLETED | OUTPATIENT
Start: 2025-08-08 | End: 2025-08-08

## 2025-08-08 RX ORDER — OXYCODONE HYDROCHLORIDE 30 MG/1
5 TABLET ORAL ONCE
Refills: 0 | Status: DISCONTINUED | OUTPATIENT
Start: 2025-08-08 | End: 2025-08-08

## 2025-08-08 RX ORDER — ACETAMINOPHEN 500 MG/5ML
1000 LIQUID (ML) ORAL ONCE
Refills: 0 | Status: COMPLETED | OUTPATIENT
Start: 2025-08-08 | End: 2025-08-08

## 2025-08-08 RX ORDER — OXYCODONE HYDROCHLORIDE 30 MG/1
1 TABLET ORAL
Qty: 15 | Refills: 0
Start: 2025-08-08

## 2025-08-08 RX ORDER — ERTAPENEM SODIUM 1 G/1
1000 INJECTION, POWDER, LYOPHILIZED, FOR SOLUTION INTRAMUSCULAR; INTRAVENOUS ONCE
Refills: 0 | Status: DISCONTINUED | OUTPATIENT
Start: 2025-08-08 | End: 2025-08-08

## 2025-08-08 RX ORDER — HYDROMORPHONE/SOD CHLOR,ISO/PF 2 MG/10 ML
1 SYRINGE (ML) INJECTION EVERY 4 HOURS
Refills: 0 | Status: DISCONTINUED | OUTPATIENT
Start: 2025-08-08 | End: 2025-08-08

## 2025-08-08 RX ORDER — HYDROMORPHONE/SOD CHLOR,ISO/PF 2 MG/10 ML
0.5 SYRINGE (ML) INJECTION
Refills: 0 | Status: DISCONTINUED | OUTPATIENT
Start: 2025-08-08 | End: 2025-08-08

## 2025-08-08 RX ADMIN — Medication 1000 MILLILITER(S): at 14:56

## 2025-08-08 RX ADMIN — Medication 4 MILLIGRAM(S): at 20:26

## 2025-08-08 RX ADMIN — Medication 20 MILLIGRAM(S): at 15:54

## 2025-08-08 RX ADMIN — Medication 400 MILLIGRAM(S): at 15:54

## 2025-08-08 RX ADMIN — Medication 4 MILLIGRAM(S): at 15:54

## 2025-08-14 LAB
CULTURE RESULTS: SIGNIFICANT CHANGE UP
CULTURE RESULTS: SIGNIFICANT CHANGE UP
SPECIMEN SOURCE: SIGNIFICANT CHANGE UP
SPECIMEN SOURCE: SIGNIFICANT CHANGE UP
SURGICAL PATHOLOGY STUDY: SIGNIFICANT CHANGE UP

## 2025-09-09 DIAGNOSIS — E29.1 TESTICULAR HYPOFUNCTION: ICD-10-CM
